# Patient Record
Sex: FEMALE | Race: WHITE | NOT HISPANIC OR LATINO | Employment: FULL TIME
[De-identification: names, ages, dates, MRNs, and addresses within clinical notes are randomized per-mention and may not be internally consistent; named-entity substitution may affect disease eponyms.]

---

## 2022-10-03 ENCOUNTER — HEALTH MAINTENANCE LETTER (OUTPATIENT)
Age: 37
End: 2022-10-03

## 2023-03-01 ENCOUNTER — HOSPITAL ENCOUNTER (INPATIENT)
Facility: CLINIC | Age: 38
LOS: 2 days | Discharge: ANOTHER HOSPITAL | DRG: 418 | End: 2023-03-03
Attending: EMERGENCY MEDICINE | Admitting: HOSPITALIST
Payer: COMMERCIAL

## 2023-03-01 ENCOUNTER — APPOINTMENT (OUTPATIENT)
Dept: ULTRASOUND IMAGING | Facility: CLINIC | Age: 38
DRG: 418 | End: 2023-03-01
Attending: EMERGENCY MEDICINE
Payer: COMMERCIAL

## 2023-03-01 ENCOUNTER — APPOINTMENT (OUTPATIENT)
Dept: MRI IMAGING | Facility: CLINIC | Age: 38
DRG: 418 | End: 2023-03-01
Attending: EMERGENCY MEDICINE
Payer: COMMERCIAL

## 2023-03-01 DIAGNOSIS — R79.89 ELEVATED LFTS: ICD-10-CM

## 2023-03-01 DIAGNOSIS — K81.0 ACUTE CHOLECYSTITIS: ICD-10-CM

## 2023-03-01 DIAGNOSIS — K83.09 CHOLANGITIS (H): Primary | ICD-10-CM

## 2023-03-01 LAB
ALBUMIN SERPL-MCNC: 3.5 G/DL (ref 3.5–5)
ALP SERPL-CCNC: 266 U/L (ref 45–120)
ALT SERPL W P-5'-P-CCNC: 567 U/L (ref 0–45)
ANION GAP SERPL CALCULATED.3IONS-SCNC: 13 MMOL/L (ref 5–18)
AST SERPL W P-5'-P-CCNC: 612 U/L (ref 0–40)
ATRIAL RATE - MUSE: 96 BPM
BASOPHILS # BLD AUTO: 0.1 10E3/UL (ref 0–0.2)
BASOPHILS NFR BLD AUTO: 1 %
BILIRUB DIRECT SERPL-MCNC: 1.1 MG/DL
BILIRUB SERPL-MCNC: 1.8 MG/DL (ref 0–1)
BUN SERPL-MCNC: 9 MG/DL (ref 8–22)
CALCIUM SERPL-MCNC: 9.6 MG/DL (ref 8.5–10.5)
CHLORIDE BLD-SCNC: 105 MMOL/L (ref 98–107)
CO2 SERPL-SCNC: 23 MMOL/L (ref 22–31)
CREAT SERPL-MCNC: 1.04 MG/DL (ref 0.6–1.1)
DIASTOLIC BLOOD PRESSURE - MUSE: NORMAL MMHG
EOSINOPHIL # BLD AUTO: 0.1 10E3/UL (ref 0–0.7)
EOSINOPHIL NFR BLD AUTO: 1 %
ERYTHROCYTE [DISTWIDTH] IN BLOOD BY AUTOMATED COUNT: 15.8 % (ref 10–15)
GFR SERPL CREATININE-BSD FRML MDRD: 70 ML/MIN/1.73M2
GLUCOSE BLD-MCNC: 125 MG/DL (ref 70–125)
HCG SERPL QL: NEGATIVE
HCT VFR BLD AUTO: 38 % (ref 35–47)
HGB BLD-MCNC: 11.9 G/DL (ref 11.7–15.7)
HOLD SPECIMEN: NORMAL
IMM GRANULOCYTES # BLD: 0.1 10E3/UL
IMM GRANULOCYTES NFR BLD: 1 %
INTERPRETATION ECG - MUSE: NORMAL
LIPASE SERPL-CCNC: 24 U/L (ref 0–52)
LYMPHOCYTES # BLD AUTO: 0.8 10E3/UL (ref 0.8–5.3)
LYMPHOCYTES NFR BLD AUTO: 10 %
MCH RBC QN AUTO: 24.4 PG (ref 26.5–33)
MCHC RBC AUTO-ENTMCNC: 31.3 G/DL (ref 31.5–36.5)
MCV RBC AUTO: 78 FL (ref 78–100)
MONOCYTES # BLD AUTO: 0.5 10E3/UL (ref 0–1.3)
MONOCYTES NFR BLD AUTO: 6 %
NEUTROPHILS # BLD AUTO: 6.4 10E3/UL (ref 1.6–8.3)
NEUTROPHILS NFR BLD AUTO: 81 %
NRBC # BLD AUTO: 0 10E3/UL
NRBC BLD AUTO-RTO: 0 /100
P AXIS - MUSE: 46 DEGREES
PLATELET # BLD AUTO: 315 10E3/UL (ref 150–450)
POTASSIUM BLD-SCNC: 3.9 MMOL/L (ref 3.5–5)
PR INTERVAL - MUSE: 134 MS
PROT SERPL-MCNC: 6.8 G/DL (ref 6–8)
QRS DURATION - MUSE: 86 MS
QT - MUSE: 368 MS
QTC - MUSE: 464 MS
R AXIS - MUSE: 74 DEGREES
RBC # BLD AUTO: 4.87 10E6/UL (ref 3.8–5.2)
SARS-COV-2 RNA RESP QL NAA+PROBE: NEGATIVE
SODIUM SERPL-SCNC: 141 MMOL/L (ref 136–145)
SYSTOLIC BLOOD PRESSURE - MUSE: NORMAL MMHG
T AXIS - MUSE: 66 DEGREES
TROPONIN I SERPL-MCNC: <0.01 NG/ML (ref 0–0.29)
TROPONIN T BLD-MCNC: 0 UG/L
VENTRICULAR RATE- MUSE: 96 BPM
WBC # BLD AUTO: 8 10E3/UL (ref 4–11)

## 2023-03-01 PROCEDURE — 84484 ASSAY OF TROPONIN QUANT: CPT | Performed by: EMERGENCY MEDICINE

## 2023-03-01 PROCEDURE — 80048 BASIC METABOLIC PNL TOTAL CA: CPT | Performed by: EMERGENCY MEDICINE

## 2023-03-01 PROCEDURE — 36415 COLL VENOUS BLD VENIPUNCTURE: CPT | Performed by: EMERGENCY MEDICINE

## 2023-03-01 PROCEDURE — C9803 HOPD COVID-19 SPEC COLLECT: HCPCS

## 2023-03-01 PROCEDURE — 96361 HYDRATE IV INFUSION ADD-ON: CPT

## 2023-03-01 PROCEDURE — 250N000011 HC RX IP 250 OP 636: Performed by: EMERGENCY MEDICINE

## 2023-03-01 PROCEDURE — 76705 ECHO EXAM OF ABDOMEN: CPT

## 2023-03-01 PROCEDURE — 80076 HEPATIC FUNCTION PANEL: CPT | Performed by: EMERGENCY MEDICINE

## 2023-03-01 PROCEDURE — 96375 TX/PRO/DX INJ NEW DRUG ADDON: CPT

## 2023-03-01 PROCEDURE — U0005 INFEC AGEN DETEC AMPLI PROBE: HCPCS | Performed by: HOSPITALIST

## 2023-03-01 PROCEDURE — 74183 MRI ABD W/O CNTR FLWD CNTR: CPT

## 2023-03-01 PROCEDURE — 120N000001 HC R&B MED SURG/OB

## 2023-03-01 PROCEDURE — 80074 ACUTE HEPATITIS PANEL: CPT | Performed by: EMERGENCY MEDICINE

## 2023-03-01 PROCEDURE — 258N000003 HC RX IP 258 OP 636: Performed by: HOSPITALIST

## 2023-03-01 PROCEDURE — 96365 THER/PROPH/DIAG IV INF INIT: CPT | Mod: 59

## 2023-03-01 PROCEDURE — 93005 ELECTROCARDIOGRAM TRACING: CPT | Performed by: EMERGENCY MEDICINE

## 2023-03-01 PROCEDURE — 255N000002 HC RX 255 OP 636: Performed by: HOSPITALIST

## 2023-03-01 PROCEDURE — 83690 ASSAY OF LIPASE: CPT | Performed by: EMERGENCY MEDICINE

## 2023-03-01 PROCEDURE — 250N000011 HC RX IP 250 OP 636: Performed by: HOSPITALIST

## 2023-03-01 PROCEDURE — 84703 CHORIONIC GONADOTROPIN ASSAY: CPT | Performed by: EMERGENCY MEDICINE

## 2023-03-01 PROCEDURE — 258N000003 HC RX IP 258 OP 636: Performed by: EMERGENCY MEDICINE

## 2023-03-01 PROCEDURE — A9585 GADOBUTROL INJECTION: HCPCS | Performed by: HOSPITALIST

## 2023-03-01 PROCEDURE — 99222 1ST HOSP IP/OBS MODERATE 55: CPT | Mod: AI | Performed by: HOSPITALIST

## 2023-03-01 PROCEDURE — 99285 EMERGENCY DEPT VISIT HI MDM: CPT | Mod: 25

## 2023-03-01 PROCEDURE — 85025 COMPLETE CBC W/AUTO DIFF WBC: CPT | Performed by: EMERGENCY MEDICINE

## 2023-03-01 RX ORDER — ACETAMINOPHEN 500 MG
500-1000 TABLET ORAL EVERY 6 HOURS PRN
COMMUNITY

## 2023-03-01 RX ORDER — LORAZEPAM 2 MG/ML
1 INJECTION INTRAMUSCULAR ONCE
Status: COMPLETED | OUTPATIENT
Start: 2023-03-01 | End: 2023-03-01

## 2023-03-01 RX ORDER — HYDROMORPHONE HYDROCHLORIDE 1 MG/ML
0.5 INJECTION, SOLUTION INTRAMUSCULAR; INTRAVENOUS; SUBCUTANEOUS
Status: DISCONTINUED | OUTPATIENT
Start: 2023-03-01 | End: 2023-03-01

## 2023-03-01 RX ORDER — ONDANSETRON 2 MG/ML
4 INJECTION INTRAMUSCULAR; INTRAVENOUS
Status: COMPLETED | OUTPATIENT
Start: 2023-03-01 | End: 2023-03-01

## 2023-03-01 RX ORDER — ONDANSETRON 2 MG/ML
4 INJECTION INTRAMUSCULAR; INTRAVENOUS EVERY 6 HOURS PRN
Status: DISCONTINUED | OUTPATIENT
Start: 2023-03-01 | End: 2023-03-02

## 2023-03-01 RX ORDER — HYDRALAZINE HYDROCHLORIDE 20 MG/ML
5 INJECTION INTRAMUSCULAR; INTRAVENOUS EVERY 6 HOURS PRN
Status: DISCONTINUED | OUTPATIENT
Start: 2023-03-01 | End: 2023-03-02

## 2023-03-01 RX ORDER — PIPERACILLIN SODIUM, TAZOBACTAM SODIUM 3; .375 G/15ML; G/15ML
3.38 INJECTION, POWDER, LYOPHILIZED, FOR SOLUTION INTRAVENOUS ONCE
Status: COMPLETED | OUTPATIENT
Start: 2023-03-01 | End: 2023-03-01

## 2023-03-01 RX ORDER — SODIUM CHLORIDE 9 MG/ML
INJECTION, SOLUTION INTRAVENOUS ONCE
Status: COMPLETED | OUTPATIENT
Start: 2023-03-01 | End: 2023-03-01

## 2023-03-01 RX ORDER — PIPERACILLIN SODIUM, TAZOBACTAM SODIUM 3; .375 G/15ML; G/15ML
3.38 INJECTION, POWDER, LYOPHILIZED, FOR SOLUTION INTRAVENOUS EVERY 8 HOURS
Status: DISCONTINUED | OUTPATIENT
Start: 2023-03-01 | End: 2023-03-02

## 2023-03-01 RX ORDER — GADOBUTROL 604.72 MG/ML
10 INJECTION INTRAVENOUS ONCE
Status: COMPLETED | OUTPATIENT
Start: 2023-03-01 | End: 2023-03-01

## 2023-03-01 RX ORDER — HYDROMORPHONE HYDROCHLORIDE 1 MG/ML
0.5 INJECTION, SOLUTION INTRAMUSCULAR; INTRAVENOUS; SUBCUTANEOUS
Status: DISCONTINUED | OUTPATIENT
Start: 2023-03-01 | End: 2023-03-02

## 2023-03-01 RX ORDER — LIDOCAINE 40 MG/G
CREAM TOPICAL
Status: DISCONTINUED | OUTPATIENT
Start: 2023-03-01 | End: 2023-03-02

## 2023-03-01 RX ADMIN — LORAZEPAM 0.5 MG: 2 INJECTION INTRAMUSCULAR; INTRAVENOUS at 18:45

## 2023-03-01 RX ADMIN — HYDROMORPHONE HYDROCHLORIDE 0.5 MG: 1 INJECTION, SOLUTION INTRAMUSCULAR; INTRAVENOUS; SUBCUTANEOUS at 16:21

## 2023-03-01 RX ADMIN — FAMOTIDINE 20 MG: 10 INJECTION, SOLUTION INTRAVENOUS at 11:43

## 2023-03-01 RX ADMIN — ONDANSETRON 4 MG: 2 INJECTION INTRAMUSCULAR; INTRAVENOUS at 09:48

## 2023-03-01 RX ADMIN — HYDROMORPHONE HYDROCHLORIDE 0.5 MG: 1 INJECTION, SOLUTION INTRAMUSCULAR; INTRAVENOUS; SUBCUTANEOUS at 12:32

## 2023-03-01 RX ADMIN — DEXTROSE AND SODIUM CHLORIDE: 5; 900 INJECTION, SOLUTION INTRAVENOUS at 17:43

## 2023-03-01 RX ADMIN — PIPERACILLIN AND TAZOBACTAM 3.38 G: 3; .375 INJECTION, POWDER, FOR SOLUTION INTRAVENOUS at 13:30

## 2023-03-01 RX ADMIN — PIPERACILLIN AND TAZOBACTAM 3.38 G: 3; .375 INJECTION, POWDER, FOR SOLUTION INTRAVENOUS at 20:33

## 2023-03-01 RX ADMIN — SODIUM CHLORIDE 1000 ML: 9 INJECTION, SOLUTION INTRAVENOUS at 09:53

## 2023-03-01 RX ADMIN — HYDROMORPHONE HYDROCHLORIDE 0.5 MG: 1 INJECTION, SOLUTION INTRAMUSCULAR; INTRAVENOUS; SUBCUTANEOUS at 22:10

## 2023-03-01 RX ADMIN — GADOBUTROL 10 ML: 604.72 INJECTION INTRAVENOUS at 19:42

## 2023-03-01 RX ADMIN — ONDANSETRON 4 MG: 2 INJECTION INTRAMUSCULAR; INTRAVENOUS at 18:44

## 2023-03-01 ASSESSMENT — ENCOUNTER SYMPTOMS
DYSURIA: 0
NAUSEA: 1
DIZZINESS: 1
ABDOMINAL PAIN: 1
BLOOD IN STOOL: 0
VOMITING: 1
SHORTNESS OF BREATH: 0
CONSTIPATION: 0
FATIGUE: 1
DIARRHEA: 1
APPETITE CHANGE: 1

## 2023-03-01 ASSESSMENT — ACTIVITIES OF DAILY LIVING (ADL)
ADLS_ACUITY_SCORE: 35

## 2023-03-01 NOTE — CONSULTS
[facility] DIGESTIVE HEALTH CONSULTATION    Sam MCLEOD Samrodriguez   [address]  [place] [address]  37 year old female    Admission Date/Time: 3/1/2023 11:04 AM    Primary Care Provider:  System, Provider Not In    Requesting Physician: Oliva Horan DO    ASSESSMENT AND RECOMMENDATIONS:   37-year-old who presents for nausea, vomiting, and abdominal pain, found to have hepatitis.    Symptoms of nausea, vomiting, and epigastric abdominal pain for the last 9 days since 2/20.  Noted to have liver injury, predominantly hepatocellular.  Differential includes biliary etiology such as cholecystitis, choledocholithiasis, cholangitis, in addition to causes of hepatitis such as hepatitis A or B or CMV.  Bilirubin nearly normal.  Other etiologies such as autoimmune hepatitis, hemochromatosis are considered less likely given acute onset.  No medications, supplements, alcohol, or drug use to explain findings.      -Hepatitis panel  -MRCP  -Appreciate surgery recommendations  -Agree with antibiotics for now  -Repeat LFTs in a.m    Approximately 45 minutes of total time was spent providing patient care including patient evaluation, reviewing documentation/test results, and .            Nixon Rao MD  Thank you for the opportunity to participate in the care of this patient.   Please feel free to call me with any questions or concerns.  Phone number [phone] or if after 5PM [phone].         CHIEF COMPLAINT:   Nausea, vomiting, abdominal pain    REASON FOR THE CONSULT: Hepatitis    HPI:   37-year-old who presents for nausea, vomiting, and abdominal pain, found to have hepatitis.    She ate sushi on February 19 and started to have nausea and vomiting the next day on the 20th.  At initial onset she had diarrhea for 3 days although this is resolved.  She her temperature was 100  F on the 20th.  She has epigastric pain which may be related to vomiting.  She has no sick contacts.  Nobody else ate the  "jessika.  She has been taking Tylenol molt Mylanta but only in small doses intermittently.  No over-the-counter medications.  No alcohol, drug use.  Non-smoker.  No mushroom foraging.    Since she got Zofran she has been feeling slightly better.  She works full-time at a nursing home.  She is accompanied today by her wife.    REVIEW OF SYSTEMS:  10 point review of systems otherwise negative    PAST MEDICAL HISTORY:  Obesity    PAST SURGICAL HISTORY:  No biliary surgery    FAMILY HISTORY:  Mother had cholecystectomy    SOCIAL HISTORY:  Non-smoker, no alcohol use.    ALLERGIES/SENSITIVITIES:  Patient has no known allergies.    MEDICATIONS:  Current Outpatient Medications   Medication Sig Dispense Refill     acetaminophen (TYLENOL) 500 MG tablet Take 500-1,000 mg by mouth every 6 hours as needed for mild pain       omeprazole (PRILOSEC) 20 MG DR capsule Take 20 mg by mouth daily as needed       PHYSICAL EXAM:  BP (!) 218/136   Pulse 87   Temp 97.7  F (36.5  C) (Tympanic)   Resp 20   Ht 1.727 m (5' 8\")   Wt 131.5 kg (290 lb)   LMP 02/16/2023   SpO2 97%   BMI 44.09 kg/m    Body mass index is 44.09 kg/m .  General: A&O, NAD, non-toxic appearing  Eyes: No icterus or conjunctivitis  ENT: MMM  Neck/Thyroid: Supple  Pulmonary: Nonlabored  Cardiovascular: Regular rate  Gastrointestinal: Soft, NTTP, NABS, no r/g, no masses, no HSM  Skin: No jaundice/petechiae/rashes  Lymph nodes: No cervical or supraclavicular lymphadenopathy  Extrem: PPI, no c/c/e    LABORATORY DATA:  CBC:  Recent Labs   Lab Test 03/01/23  0948   WBC 8.0   RBC 4.87   HGB 11.9   HCT 38.0   MCV 78   MCH 24.4*   MCHC 31.3*   RDW 15.8*           BMP:  Recent Labs   Lab 03/01/23  0948      POTASSIUM 3.9   CHLORIDE 105   CO2 23      CR 1.04   BUN 9       INR:  No results for input(s): INR in the last 53691 hours.    Liver and Pancreas panel:  Recent Labs   Lab 03/01/23  0948   *   *   ALKPHOS 266*   BILITOTAL 1.8*   LIPASE 24 "     IMAGING:    Abdomen US, limited (RUQ only)  Result Date: 3/1/2023  EXAM: US ABDOMEN LIMITED LOCATION: [facility] DATE/TIME: 3/1/2023 12:52 PM INDICATION: Epigastric abdominal pain, elevated LFTs COMPARISON: None. TECHNIQUE: Limited abdominal ultrasound. FINDINGS: GALLBLADDER: Well-distended with sludge and layering stones. Gallbladder wall is minimally thickened and there is minimal adjacent edema in the gallbladder fossa no pericholecystic fluid.. There is a sonographic Brown's sign. BILE DUCTS: No biliary dilatation. The common duct measures 6 mm. LIVER: Diffuse fatty infiltration of the liver. No focal mass. RIGHT KIDNEY: No hydronephrosis. PANCREAS: The visualized portions are normal. No ascites.     IMPRESSION: 1.  Findings suggestive of an early cholecystitis. 2.  There is gallbladder sludge and multiple tiny dependent gallstones. 3.  Common bile duct is at the upper limits of normal at 6 mm but no stones in the visible portion of the duct. No intrahepatic biliary dilatation.      CC: [facility], Provider Not In

## 2023-03-01 NOTE — CONSULTS
Surgery Note:  Chart and imaging reviewed, case discussed with ER. 36 yo female with n,v and epigastric pain for several days.  LFTs elevated, US with mildly dilated cbd. GI consulted and recommended MRCP.   -depending on repeat LFTs and findings of MRCP, will plan on either surgery tomorrow with IOC or transfer to [facility] for ERCP and surgery.   -full consult to follow     Brandon Joseph DO [facility] Surgery  [phone]

## 2023-03-01 NOTE — ED PROVIDER NOTES
EMERGENCY DEPARTMENT ENCOUNTER      NAME: Sam Morales  AGE: 37 year old female  YOB: 1985  MRN: [id number]  EVALUATION DATE & TIME: No admission date for patient encounter.    PCP: No primary care provider on file.    ED PROVIDER: Oliva Horan DO      Chief Complaint   Patient presents with     Chest Pain         FINAL IMPRESSION:  1. Acute cholecystitis    2. Elevated LFTs          ED COURSE & MEDICAL DECISION MAKING:    Pertinent Labs & Imaging studies reviewed. (See chart for details)  9:44 AM I met the patient and performed my initial interview and exam.  1:30 PM Discussed with Dr. Rao, [facility].  Recommends MRCP, acute hepatitis panel  2:10 PM Discussed with Dr. Joseph, General surgery.  Recommend admission to hospitalist, antibiotics.  3:05 PM Discussed admission with Hospitalist, Dr. Ascencio for admission    37 year old female presents to the Emergency Department for evaluation of upper abdominal pain.    MEDICAL DECISION MAKING: I was concerned about this patient's UPPER ABDOMINAL pain and considered multiple causes including but not limited to the following.        Atypical Presentation for ACS: Patient does not have SOB or chest pain.  Screening EKG was without acute ischemic changes. Troponin is negative    Transverse colitis / diverticulitis: Patient does not have diarrhea or fevers.     Appendicitis: Patient does not have RLQ TTP.      Ectopic Pregnancy: Patient is a 37 year old female.  UPT negative    Pyelonephritis: Urine is not consistent with infection    Ureterolithiasis/Renal Colic:  Urine does not show blood.     Pancreatitis:  Lipase was normal     Biliary Colic:  ABD US is with gallstones and w/o signs of biliary obstruction. LFT's are elevated    Acute cholecystitis:  ABD US is with GB wall thickening and concerns for early cholecystitis    Others benign causes including: PUD, gastritis, GERD, intestinal colic / gas pains         IMPRESSION: Based on this patient's history,  exam, and diagnostic results, the working diagnosis of this patient's abdominal pain is acute cholecystitis and concerns for choledocholithiasis.          DISPOSITION PLAN:      Admit Plan.  Patient is to be admitted to the hospitalist service.    At the conclusion of the encounter I discussed the results of all of the tests and the disposition. The questions were answered. The patient or family acknowledged understanding and was agreeable with the care plan.     Medical Decision Making    History:    Supplemental history from: Documented in chart, if applicable    External Record(s) reviewed: Documented in chart, if applicable.    Work Up:    Chart documentation includes differential considered and any EKGs or imaging independently interpreted by provider, where specified.    In additional to work up documented, I considered the following work up: Documented in chart, if applicable.    External consultation:    Discussion of management with another provider: Documented in chart, if applicable    Complicating factors:    Care impacted by chronic illness: N/A    Care affected by social determinants of health: N/A    Disposition considerations: Admit.      MEDICATIONS GIVEN IN THE EMERGENCY:  Medications   sodium chloride 0.9% infusion (has no administration in time range)   LORazepam (ATIVAN) injection 1 mg (has no administration in time range)   piperacillin-tazobactam (ZOSYN) 3.375 g vial to attach to  mL bag (has no administration in time range)   HYDROmorphone (PF) (DILAUDID) injection 0.5 mg (0.5 mg Intravenous $Given 3/1/23 1621)   ondansetron (ZOFRAN) injection 4 mg (4 mg Intravenous $Given 3/1/23 0948)   0.9% sodium chloride BOLUS (0 mLs Intravenous Stopped 3/1/23 1110)   famotidine (PEPCID) injection 20 mg (20 mg Intravenous $Given 3/1/23 1143)   piperacillin-tazobactam (ZOSYN) 3.375 g vial to attach to  mL bag (0 g Intravenous Stopped 3/1/23 1429)       NEW PRESCRIPTIONS STARTED AT TODAY'S ER  "VISIT  New Prescriptions    No medications on file          =================================================================    HPI    Patient information was obtained from: Patient    Use of : N/A         Sam Morales is a 37 year old female with no pertinent past medical history who presents to this ED for evaluation of upper abdominal, chest pain and nausea vomiting.  Noted symptoms initially last week.  He related this to eating sushi.  Thought she had food poisoning.  Had vomiting and some diarrhea with this.  Seem to improve, however started up again yesterday.  Unable to tolerate water, applesauce.  Her pain is in her upper abdomen and goes into the middle of her chest.  No daily medications.  Has been taking omeprazole from time to time due to recent increase in indigestion.  Some mucus in her emesis with scant blood.  No sunny blood.  No melena.  Low-grade fever.  Social drinker, none recently.        REVIEW OF SYSTEMS   Review of Systems   Constitutional: Positive for appetite change and fatigue.   Respiratory: Negative for shortness of breath.    Cardiovascular: Positive for chest pain.   Gastrointestinal: Positive for abdominal pain, diarrhea, nausea and vomiting. Negative for blood in stool and constipation.   Genitourinary: Negative for dysuria and vaginal discharge.   Skin: Negative.    Neurological: Positive for dizziness.       PAST MEDICAL HISTORY:  No past medical history on file.    PAST SURGICAL HISTORY:  No past surgical history on file.        CURRENT MEDICATIONS:    acetaminophen (TYLENOL) 500 MG tablet  omeprazole (PRILOSEC) 20 MG DR capsule         ALLERGIES:  No Known Allergies    FAMILY HISTORY:  No family history on file.    SOCIAL HISTORY:   Social History     Socioeconomic History     Marital status:        VITALS:  BP (!) 218/136   Pulse 87   Temp 97.7  F (36.5  C) (Tympanic)   Resp 20   Ht 1.727 m (5' 8\")   Wt 131.5 kg (290 lb)   LMP 02/16/2023   SpO2 97% "   BMI 44.09 kg/m      PHYSICAL EXAM    Physical Exam  Constitutional:       General: She is not in acute distress.  HENT:      Head: Normocephalic and atraumatic.      Mouth/Throat:      Pharynx: Oropharynx is clear.   Eyes:      Pupils: Pupils are equal, round, and reactive to light.   Cardiovascular:      Rate and Rhythm: Normal rate and regular rhythm.      Pulses: Normal pulses.      Heart sounds: Normal heart sounds.   Pulmonary:      Effort: Pulmonary effort is normal.      Breath sounds: Normal breath sounds.   Abdominal:      General: Abdomen is flat. Bowel sounds are normal.      Palpations: Abdomen is soft.      Tenderness: There is abdominal tenderness in the right upper quadrant and epigastric area. There is no guarding or rebound.   Musculoskeletal:         General: Normal range of motion.   Skin:     General: Skin is warm and dry.      Capillary Refill: Capillary refill takes less than 2 seconds.   Neurological:      General: No focal deficit present.      Mental Status: She is alert and oriented to person, place, and time.          LAB:  All pertinent labs reviewed and interpreted.  Labs Ordered and Resulted from Time of ED Arrival to Time of ED Departure   CBC WITH PLATELETS AND DIFFERENTIAL - Abnormal       Result Value    WBC Count 8.0      RBC Count 4.87      Hemoglobin 11.9      Hematocrit 38.0      MCV 78      MCH 24.4 (*)     MCHC 31.3 (*)     RDW 15.8 (*)     Platelet Count 315      % Neutrophils 81      % Lymphocytes 10      % Monocytes 6      % Eosinophils 1      % Basophils 1      % Immature Granulocytes 1      NRBCs per 100 WBC 0      Absolute Neutrophils 6.4      Absolute Lymphocytes 0.8      Absolute Monocytes 0.5      Absolute Eosinophils 0.1      Absolute Basophils 0.1      Absolute Immature Granulocytes 0.1      Absolute NRBCs 0.0     HEPATIC FUNCTION PANEL - Abnormal    Bilirubin Total 1.8 (*)     Bilirubin Direct 1.1 (*)     Protein Total 6.8      Albumin 3.5      Alkaline  Phosphatase 266 (*)      (*)      (*)    BASIC METABOLIC PANEL - Normal    Sodium 141      Potassium 3.9      Chloride 105      Carbon Dioxide (CO2) 23      Anion Gap 13      Urea Nitrogen 9      Creatinine 1.04      Calcium 9.6      Glucose 125      GFR Estimate 70     TROPONIN I - Normal    Troponin I <0.01     HCG QUALITATIVE PREGNANCY - Normal    hCG Serum Qualitative Negative     ISTAT TROPONIN POCT - Normal    TROPPC POCT 0.00     LIPASE - Normal    Lipase 24     ACUTE HEPATITIS PANEL       RADIOLOGY:  Reviewed all pertinent imaging. Please see official radiology report.  Abdomen US, limited (RUQ only)   Final Result   IMPRESSION:   1.  Findings suggestive of an early cholecystitis.   2.  There is gallbladder sludge and multiple tiny dependent gallstones.   3.  Common bile duct is at the upper limits of normal at 6 mm but no stones in the visible portion of the duct. No intrahepatic biliary dilatation.      MR Abdomen MRCP w/o & w Contrast    (Results Pending)       EKG:    Performed at: 9:42 AM    Impression: Sinus rhythm, no acute ST elevations or depressions, no prior EKG for comparison    Rate: 96 bpm  Rhythm: Sinus  Axis: Normal  ND Interval: 134 ms  QRS Interval: 86 ms  QTc Interval: 464 ms  ST Changes: Applicable  Comparison: None    I have independently reviewed and interpreted the EKG(s) documented above.    Oliva Horan DO  Emergency Medicine  [facility] EMERGENCY ROOM  [address] [facility] [address] [phone]  Dept: [phone]     Oliva Horan DO  03/01/23 3766

## 2023-03-01 NOTE — PHARMACY-ADMISSION MEDICATION HISTORY
Pharmacy Note - Admission Medication History    Pertinent Provider Information:    ______________________________________________________________________    Prior To Admission (PTA) med list completed and updated in EMR.       PTA Med List   Medication Sig Last Dose     acetaminophen (TYLENOL) 500 MG tablet Take 500-1,000 mg by mouth every 6 hours as needed for mild pain 3/1/2023 at 0600     omeprazole (PRILOSEC) 20 MG DR capsule Take 20 mg by mouth daily as needed Past Week       Information source(s): Patient and CareEverwhere/[facility]    Method of interview communication: in-person    Patient was asked about OTC/herbal products specifically.  PTA med list reflects this.    Based on the pharmacist's assessment, the PTA med list information appears reliable    Medication Affordability:  Not including over the counter (OTC) medications, was there a time in the past 12 months when you did not take your medications as prescribed because of cost?: No    Allergies were reviewed, assessed, and updated with the patient.      Patient did not bring any medications to the hospital and can't retrieve from home. No multi-dose medications are available for use during hospital stay.      Thank you for the opportunity to participate in the care of this patient.      Juni Herron [facility]     3/1/2023     12:59 PM

## 2023-03-01 NOTE — H&P
"[facility]    History and Physical - Hospitalist Service       Date of Admission:  3/1/2023    Assessment & Plan      Sam Morales is a 37 year old female who presents with acute abdominal pain.  She is currently hemodynamically stable without fever.  Her history is consistent with biliary colic, and she subsequently developed acute cholecystitis.  Her LFTs suggestive of choledocholithiasis, however no clear evidence of an obstructing stone on US.  She could have passed a gallstone.  Further evaluation with MRCP is pending.  No evidence of cholangitis currently.  Anticipate cholecystectomy +/- ERCP depending on the MRCP.    Patient with hypertensive urgency, initially considered 2/2 pain.  However, hypertension has persisted.  Treat with prn hydralazine.    # Acute cholecystitis  # Possible choledocolithiasis  - pip/tazo  - MRCP  - general surgery, GI consulted  - NPO    # Hypertensive urgency  - IV hydralazine for SBP >200       Diet: NPO per Anesthesia Guidelines for Procedure/Surgery Except for: Meds    DVT Prophylaxis: held anticipating procedure  Crooks Catheter: Not present  Lines: None     Cardiac Monitoring: None  Code Status:   full code, ok to intubate for primary respiratory failure    Clinically Significant Risk Factors Present on Admission                       # Severe Obesity: Estimated body mass index is 44.09 kg/m  as calculated from the following:    Height as of this encounter: 1.727 m (5' 8\").    Weight as of this encounter: 131.5 kg (290 lb).           Disposition Plan      Expected Discharge Date: 03/03/2023                  Trevor Ascencio MD  Hospitalist Service  [facility]  Securely message with [employer] (more info)  Text page via [facility] Paging/Directory     ______________________________________________________________________    Chief Complaint   Abdominal pain    History is obtained from the patient    History of Present Illness   Sam " HARSHA Morales is a 37 year old female without significant past medical history who presents with a chief complain of abdominal pain.    The patient reports that she had sushi a little over a week ago and subsequently developed nausea and vomiting.  She had subsequent improvement in her symptoms a few days ago.  However, yesterday afternoon, she acutely developed abdominal pain.  The pain is epigastric and radiates to the flanks.  It improved with maalox.  However, her pain recurred this morning and she subsequently presented to the ED.  She has had ongoing vomiting as well.  Denies fevers or chills.      Past Medical History    No past medical history on file.    Past Surgical History   No past surgical history on file.    Prior to Admission Medications   Prior to Admission Medications   Prescriptions Last Dose Informant Patient Reported? Taking?   acetaminophen (TYLENOL) 500 MG tablet 3/1/2023 at 0600  Yes Yes   Sig: Take 500-1,000 mg by mouth every 6 hours as needed for mild pain   omeprazole (PRILOSEC) 20 MG DR capsule Past Week  Yes Yes   Sig: Take 20 mg by mouth daily as needed      Facility-Administered Medications: None      ]  Physical Exam   Vital Signs: Temp: 97.7  F (36.5  C) Temp src: Tympanic BP: (!) 218/136 Pulse: 87   Resp: 20 SpO2: 97 % O2 Device: None (Room air)    Weight: 290 lbs 0 oz    Gen:  Sitting comfortably in chair, NAD; alert, conversant  CV:  Nl rate, regular rhythm to palpation  Pulm: no acute resp distress  GI:  Abdomen soft, non distended, epigastric TTP; negative alcantara's    Medical Decision Making       30 MINUTES SPENT BY ME on the date of service doing chart review, history, exam, documentation & further activities per the note.      Data   Na 141  K 3.9  BUN 9  Cr 1    Alk phos 266      T bili 1.8, d 1.1    WBC 8  Hgb 12  Plts 315    US abdomen  1.  Findings suggestive of an early cholecystitis.  2.  There is gallbladder sludge and multiple tiny dependent  gallstones.  3.  Common bile duct is at the upper limits of normal at 6 mm but no stones in the visible portion of the duct. No intrahepatic biliary dilatation.

## 2023-03-01 NOTE — ED TRIAGE NOTES
Pt reports chest pain and upper abdominal pain intermittently since Feb 20th.  Pt reports vomiting the 20th-24th was improving the 24th but now vomiting this morning again.  Pt states she feels anxious.

## 2023-03-02 ENCOUNTER — ANESTHESIA EVENT (OUTPATIENT)
Dept: SURGERY | Facility: CLINIC | Age: 38
DRG: 418 | End: 2023-03-02
Payer: COMMERCIAL

## 2023-03-02 ENCOUNTER — ANESTHESIA (OUTPATIENT)
Dept: SURGERY | Facility: CLINIC | Age: 38
DRG: 418 | End: 2023-03-02
Payer: COMMERCIAL

## 2023-03-02 ENCOUNTER — ANESTHESIA EVENT (OUTPATIENT)
Dept: SURGERY | Facility: HOSPITAL | Age: 38
End: 2023-03-02
Payer: COMMERCIAL

## 2023-03-02 ENCOUNTER — APPOINTMENT (OUTPATIENT)
Dept: RADIOLOGY | Facility: CLINIC | Age: 38
DRG: 418 | End: 2023-03-02
Attending: SURGERY
Payer: COMMERCIAL

## 2023-03-02 LAB
ALBUMIN SERPL-MCNC: 3.1 G/DL (ref 3.5–5)
ALP SERPL-CCNC: 277 U/L (ref 45–120)
ALT SERPL W P-5'-P-CCNC: 679 U/L (ref 0–45)
ANION GAP SERPL CALCULATED.3IONS-SCNC: 8 MMOL/L (ref 5–18)
AST SERPL W P-5'-P-CCNC: 370 U/L (ref 0–40)
ATRIAL RATE - MUSE: 88 BPM
BILIRUB DIRECT SERPL-MCNC: 1.8 MG/DL
BILIRUB SERPL-MCNC: 2.7 MG/DL (ref 0–1)
BUN SERPL-MCNC: 6 MG/DL (ref 8–22)
CALCIUM SERPL-MCNC: 8.8 MG/DL (ref 8.5–10.5)
CHLORIDE BLD-SCNC: 108 MMOL/L (ref 98–107)
CO2 SERPL-SCNC: 25 MMOL/L (ref 22–31)
CREAT SERPL-MCNC: 1 MG/DL (ref 0.6–1.1)
DIASTOLIC BLOOD PRESSURE - MUSE: NORMAL MMHG
ERYTHROCYTE [DISTWIDTH] IN BLOOD BY AUTOMATED COUNT: 15.9 % (ref 10–15)
GFR SERPL CREATININE-BSD FRML MDRD: 73 ML/MIN/1.73M2
GLUCOSE BLD-MCNC: 138 MG/DL (ref 70–125)
HCT VFR BLD AUTO: 35.2 % (ref 35–47)
HGB BLD-MCNC: 10.7 G/DL (ref 11.7–15.7)
INTERPRETATION ECG - MUSE: NORMAL
MCH RBC QN AUTO: 24.5 PG (ref 26.5–33)
MCHC RBC AUTO-ENTMCNC: 30.4 G/DL (ref 31.5–36.5)
MCV RBC AUTO: 81 FL (ref 78–100)
P AXIS - MUSE: 17 DEGREES
PLATELET # BLD AUTO: 232 10E3/UL (ref 150–450)
POTASSIUM BLD-SCNC: 3.8 MMOL/L (ref 3.5–5)
PR INTERVAL - MUSE: 142 MS
PROT SERPL-MCNC: 6 G/DL (ref 6–8)
QRS DURATION - MUSE: 86 MS
QT - MUSE: 386 MS
QTC - MUSE: 467 MS
R AXIS - MUSE: 29 DEGREES
RBC # BLD AUTO: 4.36 10E6/UL (ref 3.8–5.2)
SODIUM SERPL-SCNC: 141 MMOL/L (ref 136–145)
SYSTOLIC BLOOD PRESSURE - MUSE: NORMAL MMHG
T AXIS - MUSE: 22 DEGREES
VENTRICULAR RATE- MUSE: 88 BPM
WBC # BLD AUTO: 8.8 10E3/UL (ref 4–11)

## 2023-03-02 PROCEDURE — 258N000003 HC RX IP 258 OP 636

## 2023-03-02 PROCEDURE — 120N000001 HC R&B MED SURG/OB

## 2023-03-02 PROCEDURE — 272N000001 HC OR GENERAL SUPPLY STERILE: Performed by: SURGERY

## 2023-03-02 PROCEDURE — 250N000013 HC RX MED GY IP 250 OP 250 PS 637: Performed by: HOSPITALIST

## 2023-03-02 PROCEDURE — 258N000003 HC RX IP 258 OP 636: Performed by: SURGERY

## 2023-03-02 PROCEDURE — 370N000017 HC ANESTHESIA TECHNICAL FEE, PER MIN: Performed by: SURGERY

## 2023-03-02 PROCEDURE — 250N000013 HC RX MED GY IP 250 OP 250 PS 637: Performed by: SURGERY

## 2023-03-02 PROCEDURE — 82248 BILIRUBIN DIRECT: CPT | Performed by: HOSPITALIST

## 2023-03-02 PROCEDURE — 93005 ELECTROCARDIOGRAM TRACING: CPT | Performed by: HOSPITALIST

## 2023-03-02 PROCEDURE — 80053 COMPREHEN METABOLIC PANEL: CPT | Performed by: HOSPITALIST

## 2023-03-02 PROCEDURE — 360N000083 HC SURGERY LEVEL 3 W/ FLUORO, PER MIN: Performed by: SURGERY

## 2023-03-02 PROCEDURE — 258N000003 HC RX IP 258 OP 636: Performed by: HOSPITALIST

## 2023-03-02 PROCEDURE — 255N000002 HC RX 255 OP 636: Performed by: SURGERY

## 2023-03-02 PROCEDURE — 47563 LAPARO CHOLECYSTECTOMY/GRAPH: CPT | Performed by: SURGERY

## 2023-03-02 PROCEDURE — 99232 SBSQ HOSP IP/OBS MODERATE 35: CPT | Performed by: HOSPITALIST

## 2023-03-02 PROCEDURE — 250N000025 HC SEVOFLURANE, PER MIN: Performed by: SURGERY

## 2023-03-02 PROCEDURE — 88342 IMHCHEM/IMCYTCHM 1ST ANTB: CPT | Mod: TC | Performed by: SURGERY

## 2023-03-02 PROCEDURE — 85014 HEMATOCRIT: CPT | Performed by: HOSPITALIST

## 2023-03-02 PROCEDURE — 0FT44ZZ RESECTION OF GALLBLADDER, PERCUTANEOUS ENDOSCOPIC APPROACH: ICD-10-PCS | Performed by: SURGERY

## 2023-03-02 PROCEDURE — 999N000141 HC STATISTIC PRE-PROCEDURE NURSING ASSESSMENT: Performed by: SURGERY

## 2023-03-02 PROCEDURE — 710N000010 HC RECOVERY PHASE 1, LEVEL 2, PER MIN: Performed by: SURGERY

## 2023-03-02 PROCEDURE — 250N000009 HC RX 250: Performed by: NURSE ANESTHETIST, CERTIFIED REGISTERED

## 2023-03-02 PROCEDURE — 250N000011 HC RX IP 250 OP 636

## 2023-03-02 PROCEDURE — 250N000011 HC RX IP 250 OP 636: Performed by: NURSE ANESTHETIST, CERTIFIED REGISTERED

## 2023-03-02 PROCEDURE — 999N000182 XR SURGERY CARM FLUORO GREATER THAN 5 MIN: Mod: TC

## 2023-03-02 PROCEDURE — 250N000011 HC RX IP 250 OP 636: Performed by: HOSPITALIST

## 2023-03-02 PROCEDURE — 36415 COLL VENOUS BLD VENIPUNCTURE: CPT | Performed by: HOSPITALIST

## 2023-03-02 PROCEDURE — 250N000011 HC RX IP 250 OP 636: Performed by: SURGERY

## 2023-03-02 PROCEDURE — 99221 1ST HOSP IP/OBS SF/LOW 40: CPT | Mod: 57 | Performed by: SURGERY

## 2023-03-02 RX ORDER — ONDANSETRON 4 MG/1
4 TABLET, ORALLY DISINTEGRATING ORAL EVERY 6 HOURS PRN
Status: CANCELLED | OUTPATIENT
Start: 2023-03-02

## 2023-03-02 RX ORDER — HYDROMORPHONE HCL IN WATER/PF 6 MG/30 ML
0.2 PATIENT CONTROLLED ANALGESIA SYRINGE INTRAVENOUS
Status: CANCELLED | OUTPATIENT
Start: 2023-03-02

## 2023-03-02 RX ORDER — ESMOLOL HYDROCHLORIDE 10 MG/ML
INJECTION INTRAVENOUS PRN
Status: DISCONTINUED | OUTPATIENT
Start: 2023-03-02 | End: 2023-03-02

## 2023-03-02 RX ORDER — CEFAZOLIN SODIUM/WATER 3 G/30 ML
SYRINGE (ML) INTRAVENOUS
Status: DISCONTINUED
Start: 2023-03-02 | End: 2023-03-02 | Stop reason: HOSPADM

## 2023-03-02 RX ORDER — ACETAMINOPHEN 325 MG/1
975 TABLET ORAL ONCE
Status: COMPLETED | OUTPATIENT
Start: 2023-03-02 | End: 2023-03-02

## 2023-03-02 RX ORDER — LIDOCAINE 40 MG/G
CREAM TOPICAL
Status: DISCONTINUED | OUTPATIENT
Start: 2023-03-02 | End: 2023-03-02 | Stop reason: HOSPADM

## 2023-03-02 RX ORDER — NALOXONE HYDROCHLORIDE 0.4 MG/ML
0.4 INJECTION, SOLUTION INTRAMUSCULAR; INTRAVENOUS; SUBCUTANEOUS
Status: DISCONTINUED | OUTPATIENT
Start: 2023-03-02 | End: 2023-03-03 | Stop reason: HOSPADM

## 2023-03-02 RX ORDER — LABETALOL HYDROCHLORIDE 5 MG/ML
10 INJECTION, SOLUTION INTRAVENOUS
Status: COMPLETED | OUTPATIENT
Start: 2023-03-02 | End: 2023-03-02

## 2023-03-02 RX ORDER — BUPIVACAINE HYDROCHLORIDE 2.5 MG/ML
INJECTION, SOLUTION INFILTRATION; PERINEURAL
Status: DISCONTINUED
Start: 2023-03-02 | End: 2023-03-02 | Stop reason: HOSPADM

## 2023-03-02 RX ORDER — ONDANSETRON 4 MG/1
4 TABLET, ORALLY DISINTEGRATING ORAL EVERY 6 HOURS PRN
Status: DISCONTINUED | OUTPATIENT
Start: 2023-03-02 | End: 2023-03-03 | Stop reason: HOSPADM

## 2023-03-02 RX ORDER — HYDROMORPHONE HCL IN WATER/PF 6 MG/30 ML
0.4 PATIENT CONTROLLED ANALGESIA SYRINGE INTRAVENOUS
Status: CANCELLED | OUTPATIENT
Start: 2023-03-02

## 2023-03-02 RX ORDER — SODIUM CHLORIDE, SODIUM LACTATE, POTASSIUM CHLORIDE, AND CALCIUM CHLORIDE .6; .31; .03; .02 G/100ML; G/100ML; G/100ML; G/100ML
IRRIGANT IRRIGATION PRN
Status: DISCONTINUED | OUTPATIENT
Start: 2023-03-02 | End: 2023-03-02 | Stop reason: HOSPADM

## 2023-03-02 RX ORDER — PROCHLORPERAZINE MALEATE 10 MG
10 TABLET ORAL EVERY 6 HOURS PRN
Status: CANCELLED | OUTPATIENT
Start: 2023-03-02

## 2023-03-02 RX ORDER — BUPIVACAINE HYDROCHLORIDE 2.5 MG/ML
INJECTION, SOLUTION INFILTRATION; PERINEURAL PRN
Status: DISCONTINUED | OUTPATIENT
Start: 2023-03-02 | End: 2023-03-02 | Stop reason: HOSPADM

## 2023-03-02 RX ORDER — ONDANSETRON 2 MG/ML
INJECTION INTRAMUSCULAR; INTRAVENOUS PRN
Status: DISCONTINUED | OUTPATIENT
Start: 2023-03-02 | End: 2023-03-02

## 2023-03-02 RX ORDER — LIDOCAINE HYDROCHLORIDE 10 MG/ML
INJECTION, SOLUTION INFILTRATION; PERINEURAL PRN
Status: DISCONTINUED | OUTPATIENT
Start: 2023-03-02 | End: 2023-03-02

## 2023-03-02 RX ORDER — FENTANYL CITRATE 50 UG/ML
INJECTION, SOLUTION INTRAMUSCULAR; INTRAVENOUS PRN
Status: DISCONTINUED | OUTPATIENT
Start: 2023-03-02 | End: 2023-03-02

## 2023-03-02 RX ORDER — PROCHLORPERAZINE MALEATE 10 MG
10 TABLET ORAL EVERY 6 HOURS PRN
Status: DISCONTINUED | OUTPATIENT
Start: 2023-03-02 | End: 2023-03-03 | Stop reason: HOSPADM

## 2023-03-02 RX ORDER — LIDOCAINE 40 MG/G
CREAM TOPICAL
Status: DISCONTINUED | OUTPATIENT
Start: 2023-03-02 | End: 2023-03-03 | Stop reason: HOSPADM

## 2023-03-02 RX ORDER — AMLODIPINE BESYLATE 5 MG/1
5 TABLET ORAL DAILY
Status: DISCONTINUED | OUTPATIENT
Start: 2023-03-02 | End: 2023-03-03

## 2023-03-02 RX ORDER — BISACODYL 10 MG
10 SUPPOSITORY, RECTAL RECTAL DAILY PRN
Status: CANCELLED | OUTPATIENT
Start: 2023-03-02

## 2023-03-02 RX ORDER — NALOXONE HYDROCHLORIDE 0.4 MG/ML
0.2 INJECTION, SOLUTION INTRAMUSCULAR; INTRAVENOUS; SUBCUTANEOUS
Status: DISCONTINUED | OUTPATIENT
Start: 2023-03-02 | End: 2023-03-03 | Stop reason: HOSPADM

## 2023-03-02 RX ORDER — HYDRALAZINE HYDROCHLORIDE 20 MG/ML
10 INJECTION INTRAMUSCULAR; INTRAVENOUS
Status: DISCONTINUED | OUTPATIENT
Start: 2023-03-02 | End: 2023-03-02 | Stop reason: HOSPADM

## 2023-03-02 RX ORDER — ONDANSETRON 2 MG/ML
4 INJECTION INTRAMUSCULAR; INTRAVENOUS EVERY 6 HOURS PRN
Status: CANCELLED | OUTPATIENT
Start: 2023-03-02

## 2023-03-02 RX ORDER — AMOXICILLIN 250 MG
1 CAPSULE ORAL 2 TIMES DAILY
Status: DISCONTINUED | OUTPATIENT
Start: 2023-03-02 | End: 2023-03-03 | Stop reason: HOSPADM

## 2023-03-02 RX ORDER — PROPOFOL 10 MG/ML
INJECTION, EMULSION INTRAVENOUS CONTINUOUS PRN
Status: DISCONTINUED | OUTPATIENT
Start: 2023-03-02 | End: 2023-03-02

## 2023-03-02 RX ORDER — ACETAMINOPHEN 325 MG/1
975 TABLET ORAL EVERY 8 HOURS
Status: DISCONTINUED | OUTPATIENT
Start: 2023-03-02 | End: 2023-03-02

## 2023-03-02 RX ORDER — SODIUM CHLORIDE, SODIUM LACTATE, POTASSIUM CHLORIDE, CALCIUM CHLORIDE 600; 310; 30; 20 MG/100ML; MG/100ML; MG/100ML; MG/100ML
INJECTION, SOLUTION INTRAVENOUS CONTINUOUS
Status: DISCONTINUED | OUTPATIENT
Start: 2023-03-02 | End: 2023-03-02 | Stop reason: HOSPADM

## 2023-03-02 RX ORDER — TRAMADOL HYDROCHLORIDE 50 MG/1
50 TABLET ORAL EVERY 6 HOURS PRN
Status: DISCONTINUED | OUTPATIENT
Start: 2023-03-02 | End: 2023-03-03 | Stop reason: HOSPADM

## 2023-03-02 RX ORDER — DEXAMETHASONE SODIUM PHOSPHATE 10 MG/ML
INJECTION, SOLUTION INTRAMUSCULAR; INTRAVENOUS PRN
Status: DISCONTINUED | OUTPATIENT
Start: 2023-03-02 | End: 2023-03-02

## 2023-03-02 RX ORDER — TRAMADOL HYDROCHLORIDE 50 MG/1
50 TABLET ORAL EVERY 6 HOURS PRN
Status: CANCELLED | OUTPATIENT
Start: 2023-03-02

## 2023-03-02 RX ORDER — HYDROMORPHONE HCL IN WATER/PF 6 MG/30 ML
0.2 PATIENT CONTROLLED ANALGESIA SYRINGE INTRAVENOUS EVERY 5 MIN PRN
Status: DISCONTINUED | OUTPATIENT
Start: 2023-03-02 | End: 2023-03-02 | Stop reason: HOSPADM

## 2023-03-02 RX ORDER — AMLODIPINE BESYLATE 5 MG/1
5 TABLET ORAL DAILY
Status: CANCELLED | OUTPATIENT
Start: 2023-03-02

## 2023-03-02 RX ORDER — FENTANYL CITRATE 50 UG/ML
50 INJECTION, SOLUTION INTRAMUSCULAR; INTRAVENOUS EVERY 5 MIN PRN
Status: DISCONTINUED | OUTPATIENT
Start: 2023-03-02 | End: 2023-03-02 | Stop reason: HOSPADM

## 2023-03-02 RX ORDER — PIPERACILLIN SODIUM, TAZOBACTAM SODIUM 3; .375 G/15ML; G/15ML
3.38 INJECTION, POWDER, LYOPHILIZED, FOR SOLUTION INTRAVENOUS EVERY 8 HOURS
Status: DISCONTINUED | OUTPATIENT
Start: 2023-03-02 | End: 2023-03-03 | Stop reason: HOSPADM

## 2023-03-02 RX ORDER — HYDRALAZINE HYDROCHLORIDE 25 MG/1
25 TABLET, FILM COATED ORAL ONCE
Status: COMPLETED | OUTPATIENT
Start: 2023-03-02 | End: 2023-03-02

## 2023-03-02 RX ORDER — ONDANSETRON 4 MG/1
4 TABLET, ORALLY DISINTEGRATING ORAL EVERY 30 MIN PRN
Status: DISCONTINUED | OUTPATIENT
Start: 2023-03-02 | End: 2023-03-02 | Stop reason: HOSPADM

## 2023-03-02 RX ORDER — CEFAZOLIN SODIUM/WATER 3 G/30 ML
3 SYRINGE (ML) INTRAVENOUS SEE ADMIN INSTRUCTIONS
Status: DISCONTINUED | OUTPATIENT
Start: 2023-03-02 | End: 2023-03-02 | Stop reason: HOSPADM

## 2023-03-02 RX ORDER — LIDOCAINE 40 MG/G
CREAM TOPICAL
Status: CANCELLED | OUTPATIENT
Start: 2023-03-02

## 2023-03-02 RX ORDER — HYDROMORPHONE HCL IN WATER/PF 6 MG/30 ML
0.4 PATIENT CONTROLLED ANALGESIA SYRINGE INTRAVENOUS
Status: DISCONTINUED | OUTPATIENT
Start: 2023-03-02 | End: 2023-03-03 | Stop reason: HOSPADM

## 2023-03-02 RX ORDER — ONDANSETRON 2 MG/ML
4 INJECTION INTRAMUSCULAR; INTRAVENOUS EVERY 30 MIN PRN
Status: DISCONTINUED | OUTPATIENT
Start: 2023-03-02 | End: 2023-03-02 | Stop reason: HOSPADM

## 2023-03-02 RX ORDER — AMOXICILLIN 250 MG
1 CAPSULE ORAL 2 TIMES DAILY
Status: CANCELLED | OUTPATIENT
Start: 2023-03-02

## 2023-03-02 RX ORDER — ACETAMINOPHEN 325 MG/1
650 TABLET ORAL EVERY 4 HOURS PRN
Status: DISCONTINUED | OUTPATIENT
Start: 2023-03-05 | End: 2023-03-03 | Stop reason: HOSPADM

## 2023-03-02 RX ORDER — KETAMINE HYDROCHLORIDE 10 MG/ML
INJECTION INTRAMUSCULAR; INTRAVENOUS PRN
Status: DISCONTINUED | OUTPATIENT
Start: 2023-03-02 | End: 2023-03-02

## 2023-03-02 RX ORDER — HYDROMORPHONE HCL IN WATER/PF 6 MG/30 ML
0.4 PATIENT CONTROLLED ANALGESIA SYRINGE INTRAVENOUS EVERY 5 MIN PRN
Status: DISCONTINUED | OUTPATIENT
Start: 2023-03-02 | End: 2023-03-02 | Stop reason: HOSPADM

## 2023-03-02 RX ORDER — FENTANYL CITRATE 50 UG/ML
50 INJECTION, SOLUTION INTRAMUSCULAR; INTRAVENOUS
Status: DISCONTINUED | OUTPATIENT
Start: 2023-03-02 | End: 2023-03-02 | Stop reason: HOSPADM

## 2023-03-02 RX ORDER — ONDANSETRON 2 MG/ML
4 INJECTION INTRAMUSCULAR; INTRAVENOUS EVERY 6 HOURS PRN
Status: DISCONTINUED | OUTPATIENT
Start: 2023-03-02 | End: 2023-03-03 | Stop reason: HOSPADM

## 2023-03-02 RX ORDER — POLYETHYLENE GLYCOL 3350 17 G/17G
17 POWDER, FOR SOLUTION ORAL DAILY
Status: CANCELLED | OUTPATIENT
Start: 2023-03-03

## 2023-03-02 RX ORDER — HALOPERIDOL 5 MG/ML
1 INJECTION INTRAMUSCULAR
Status: DISCONTINUED | OUTPATIENT
Start: 2023-03-02 | End: 2023-03-02 | Stop reason: HOSPADM

## 2023-03-02 RX ORDER — PROPOFOL 10 MG/ML
INJECTION, EMULSION INTRAVENOUS PRN
Status: DISCONTINUED | OUTPATIENT
Start: 2023-03-02 | End: 2023-03-02

## 2023-03-02 RX ORDER — FENTANYL CITRATE 50 UG/ML
25 INJECTION, SOLUTION INTRAMUSCULAR; INTRAVENOUS EVERY 5 MIN PRN
Status: DISCONTINUED | OUTPATIENT
Start: 2023-03-02 | End: 2023-03-02 | Stop reason: HOSPADM

## 2023-03-02 RX ORDER — POLYETHYLENE GLYCOL 3350 17 G/17G
17 POWDER, FOR SOLUTION ORAL DAILY
Status: DISCONTINUED | OUTPATIENT
Start: 2023-03-03 | End: 2023-03-03 | Stop reason: HOSPADM

## 2023-03-02 RX ORDER — BISACODYL 10 MG
10 SUPPOSITORY, RECTAL RECTAL DAILY PRN
Status: DISCONTINUED | OUTPATIENT
Start: 2023-03-02 | End: 2023-03-03 | Stop reason: HOSPADM

## 2023-03-02 RX ORDER — CEFAZOLIN SODIUM/WATER 3 G/30 ML
3 SYRINGE (ML) INTRAVENOUS
Status: DISCONTINUED | OUTPATIENT
Start: 2023-03-02 | End: 2023-03-02 | Stop reason: HOSPADM

## 2023-03-02 RX ORDER — PIPERACILLIN SODIUM, TAZOBACTAM SODIUM 3; .375 G/15ML; G/15ML
3.38 INJECTION, POWDER, LYOPHILIZED, FOR SOLUTION INTRAVENOUS EVERY 8 HOURS
Status: CANCELLED | OUTPATIENT
Start: 2023-03-02

## 2023-03-02 RX ORDER — HYDROMORPHONE HCL IN WATER/PF 6 MG/30 ML
0.2 PATIENT CONTROLLED ANALGESIA SYRINGE INTRAVENOUS
Status: DISCONTINUED | OUTPATIENT
Start: 2023-03-02 | End: 2023-03-03 | Stop reason: HOSPADM

## 2023-03-02 RX ADMIN — ESMOLOL HYDROCHLORIDE 20 MG: 100 INJECTION, SOLUTION INTRAVENOUS at 10:53

## 2023-03-02 RX ADMIN — LIDOCAINE HYDROCHLORIDE 4 ML: 10 INJECTION, SOLUTION INFILTRATION; PERINEURAL at 10:16

## 2023-03-02 RX ADMIN — SODIUM CHLORIDE, POTASSIUM CHLORIDE, SODIUM LACTATE AND CALCIUM CHLORIDE: 600; 310; 30; 20 INJECTION, SOLUTION INTRAVENOUS at 09:02

## 2023-03-02 RX ADMIN — PIPERACILLIN AND TAZOBACTAM 3.38 G: 3; .375 INJECTION, POWDER, FOR SOLUTION INTRAVENOUS at 04:01

## 2023-03-02 RX ADMIN — ROCURONIUM BROMIDE 20 MG: 10 INJECTION, SOLUTION INTRAVENOUS at 11:33

## 2023-03-02 RX ADMIN — PIPERACILLIN AND TAZOBACTAM 3.38 G: 3; .375 INJECTION, POWDER, FOR SOLUTION INTRAVENOUS at 15:29

## 2023-03-02 RX ADMIN — MIDAZOLAM 2 MG: 1 INJECTION INTRAMUSCULAR; INTRAVENOUS at 10:05

## 2023-03-02 RX ADMIN — PIPERACILLIN AND TAZOBACTAM 3.38 G: 3; .375 INJECTION, POWDER, FOR SOLUTION INTRAVENOUS at 22:35

## 2023-03-02 RX ADMIN — ESMOLOL HYDROCHLORIDE 20 MG: 100 INJECTION, SOLUTION INTRAVENOUS at 10:50

## 2023-03-02 RX ADMIN — DEXAMETHASONE SODIUM PHOSPHATE 10 MG: 10 INJECTION, SOLUTION INTRAMUSCULAR; INTRAVENOUS at 10:26

## 2023-03-02 RX ADMIN — ESMOLOL HYDROCHLORIDE 20 MG: 100 INJECTION, SOLUTION INTRAVENOUS at 11:19

## 2023-03-02 RX ADMIN — FENTANYL CITRATE 100 MCG: 50 INJECTION, SOLUTION INTRAMUSCULAR; INTRAVENOUS at 10:16

## 2023-03-02 RX ADMIN — SODIUM CHLORIDE, POTASSIUM CHLORIDE, SODIUM LACTATE AND CALCIUM CHLORIDE: 600; 310; 30; 20 INJECTION, SOLUTION INTRAVENOUS at 11:08

## 2023-03-02 RX ADMIN — KETAMINE HYDROCHLORIDE 50 MG: 10 INJECTION, SOLUTION INTRAMUSCULAR; INTRAVENOUS at 10:24

## 2023-03-02 RX ADMIN — PROPOFOL 200 MG: 10 INJECTION, EMULSION INTRAVENOUS at 10:16

## 2023-03-02 RX ADMIN — LABETALOL HYDROCHLORIDE 10 MG: 5 INJECTION, SOLUTION INTRAVENOUS at 12:48

## 2023-03-02 RX ADMIN — LABETALOL HYDROCHLORIDE 10 MG: 5 INJECTION, SOLUTION INTRAVENOUS at 13:15

## 2023-03-02 RX ADMIN — AMLODIPINE BESYLATE 5 MG: 5 TABLET ORAL at 07:48

## 2023-03-02 RX ADMIN — HYDROMORPHONE HYDROCHLORIDE 1 MG: 1 INJECTION, SOLUTION INTRAMUSCULAR; INTRAVENOUS; SUBCUTANEOUS at 10:35

## 2023-03-02 RX ADMIN — HYDRALAZINE HYDROCHLORIDE 25 MG: 25 TABLET, FILM COATED ORAL at 17:01

## 2023-03-02 RX ADMIN — TRAMADOL HYDROCHLORIDE 50 MG: 50 TABLET, COATED ORAL at 20:00

## 2023-03-02 RX ADMIN — ACETAMINOPHEN 975 MG: 325 SOLUTION ORAL at 15:45

## 2023-03-02 RX ADMIN — ONDANSETRON 4 MG: 2 INJECTION INTRAMUSCULAR; INTRAVENOUS at 10:26

## 2023-03-02 RX ADMIN — ROCURONIUM BROMIDE 50 MG: 10 INJECTION, SOLUTION INTRAVENOUS at 10:16

## 2023-03-02 RX ADMIN — SUGAMMADEX 400 MG: 100 INJECTION, SOLUTION INTRAVENOUS at 11:51

## 2023-03-02 RX ADMIN — HYDROMORPHONE HYDROCHLORIDE 0.5 MG: 1 INJECTION, SOLUTION INTRAMUSCULAR; INTRAVENOUS; SUBCUTANEOUS at 03:50

## 2023-03-02 RX ADMIN — SENNOSIDES AND DOCUSATE SODIUM 1 TABLET: 50; 8.6 TABLET ORAL at 20:00

## 2023-03-02 RX ADMIN — ESMOLOL HYDROCHLORIDE 30 MG: 100 INJECTION, SOLUTION INTRAVENOUS at 11:05

## 2023-03-02 RX ADMIN — DEXTROSE AND SODIUM CHLORIDE: 5; 900 INJECTION, SOLUTION INTRAVENOUS at 01:50

## 2023-03-02 RX ADMIN — PROPOFOL 100 MCG/KG/MIN: 10 INJECTION, EMULSION INTRAVENOUS at 10:16

## 2023-03-02 ASSESSMENT — ACTIVITIES OF DAILY LIVING (ADL)
ADLS_ACUITY_SCORE: 35
WALKING_OR_CLIMBING_STAIRS_DIFFICULTY: NO
EATING/SWALLOWING: OTHER (SEE COMMENTS)
DOING_ERRANDS_INDEPENDENTLY_DIFFICULTY: NO
ADLS_ACUITY_SCORE: 35
EATING: 0-->INDEPENDENT
DRESSING/BATHING_DIFFICULTY: NO
ADLS_ACUITY_SCORE: 35
DIFFICULTY_EATING/SWALLOWING: YES
ADLS_ACUITY_SCORE: 35
ADLS_ACUITY_SCORE: 35
CONCENTRATING,_REMEMBERING_OR_MAKING_DECISIONS_DIFFICULTY: NO
ADLS_ACUITY_SCORE: 35
SWALLOWING: 0-->SWALLOWS FOODS/LIQUIDS WITHOUT DIFFICULTY (DEVELOPMENTALLY APPROPRIATE)
SWALLOWING: 0-->SWALLOWS FOODS/LIQUIDS WITHOUT DIFFICULTY
EATING: 0-->INDEPENDENT
CHANGE_IN_FUNCTIONAL_STATUS_SINCE_ONSET_OF_CURRENT_ILLNESS/INJURY: NO
WEAR_GLASSES_OR_BLIND: NO
ADLS_ACUITY_SCORE: 35
ADLS_ACUITY_SCORE: 35
FALL_HISTORY_WITHIN_LAST_SIX_MONTHS: NO
ADLS_ACUITY_SCORE: 22
TOILETING_ISSUES: NO
ADLS_ACUITY_SCORE: 35

## 2023-03-02 NOTE — PROVIDER NOTIFICATION
Updated Anesthesia MD Hsieh, Pt 20 mins post Labetalol  SBPs upper 170's over 98 (see Epic Flowsheet)  Per Dr. Hsieh, give addition 10mg IV Labetalol per PRN order and monitor for 20 mins post dose prior to transferring to holding or floor.

## 2023-03-02 NOTE — PLAN OF CARE
"Goal Outcome Evaluation:       A&Ox4, able to make needs known. Complained of abdominal pain 1x, dilaudid IV provided per PRN order, effective. D5NS restarted for patient around 0200, additional IV access obtained for 4hr zosyn infusion. Using RA. BP elevated, see flowsheets for details. Most recent vitals:  BP (!) 182/101 (BP Location: Left arm, Patient Position: Supine, Cuff Size: Adult Large)   Pulse 90   Temp 98.2  F (36.8  C) (Oral)   Resp 18   Ht 1.727 m (5' 8\")   Wt 131.5 kg (290 lb)   LMP 02/16/2023   SpO2 97%   BMI 44.09 kg/m       Problem: Pain Acute  Goal: Optimal Pain Control and Function  Outcome: Progressing     Problem: Cholecystectomy  Goal: Fluid and Electrolyte Balance  Outcome: Progressing  Goal: Absence of Infection Signs and Symptoms  Outcome: Progressing                    "

## 2023-03-02 NOTE — ANESTHESIA CARE TRANSFER NOTE
Patient: Sam Morales    Procedure: Procedure(s):  CHOLECYSTECTOMY, LAPAROSCOPIC  with cholangiograms       Diagnosis: Acute cholecystitis [K81.0]  Diagnosis Additional Information: No value filed.    Anesthesia Type:   General     Note:    Oropharynx: oropharynx clear of all foreign objects and spontaneously breathing  Level of Consciousness: drowsy  Oxygen Supplementation: face mask  Level of Supplemental Oxygen (L/min / FiO2): 8  Independent Airway: airway patency satisfactory and stable  Dentition: dentition unchanged  Vital Signs Stable: post-procedure vital signs reviewed and stable  Report to RN Given: handoff report given  Patient transferred to: PACU    Handoff Report: Identifed the Patient, Identified the Reponsible Provider, Reviewed the pertinent medical history, Discussed the surgical course, Reviewed Intra-OP anesthesia mangement and issues during anesthesia, Set expectations for post-procedure period and Allowed opportunity for questions and acknowledgement of understanding      Vitals:  Vitals Value Taken Time   /97 03/02/23 1211   Temp 36.3  C (97.3  F) 03/02/23 1209   Pulse 90 03/02/23 1213   Resp 22 03/02/23 1213   SpO2 97 % 03/02/23 1213   Vitals shown include unvalidated device data.    Electronically Signed By: DAMIEN Garcia CRNA  March 2, 2023  12:15 PM

## 2023-03-02 NOTE — OP NOTE
Operative Report:    Name:  Sam Morales  PCP:  System, Provider Not In  Procedure Date:  3/1/2023 - 3/2/2023      Procedure(s):  CHOLECYSTECTOMY, LAPAROSCOPIC  with cholangiograms    Pre-Procedure Diagnosis:  Acute cholecystitis [K81.0]     Post-Procedure Diagnosis:    * No post-op diagnosis entered *    Surgeon(s):  Mayco Joseph DO    Circulator: Denia Urrutia RN; Trudi Fernandez RN  Scrub Person: HECTOR HANCOCK  X-Ray Technologist: Pita Weinstein ARRT    Anesthesia Type:  GET      Findings:  choledocholithiasis    Operative Report:    The patient was taken to the operating room and placed in a supine position.  Endotracheal intubation was performed.  SCDs were placed on bilateral lower extremities.  Antibiotics were given prior to skin incision.  The abdomen was prepped and draped in a sterile fashion.    I began the first portion of the procedure by injecting quarter percent Marcaine with epinephrine into the supraumbilical position.  I then made a 5 mm transverse incision above the umbilicus and established a pneumoperitoneum using a Veress needle technique.  Once the pneumoperitoneum was established,I placed a 5 mm trocar with a 5 mm 30  camera into the abdomen.  The surrounding structures were scrutinized for any injuries upon entry.  I did not find any. I placed an 11 mm trocar in the subxiphoid position as well as 2 right upper quadrant 5 mm trochars under direct visualization.  All trochars were placed after local anesthetic was injected to the fascial layers.    I first spent quite some time taking down the adhesions surrounding the gallbladder.  There were thick fibrous adhesions indicative of chronic cholecystitis.  Eventually I was able to dissect out the gallbladder.  I then had my assistant retract the gallbladder cephalad and I persisted to dissect out the cystic duct and cystic artery in the standard fashion using blunt dissection and Bovie electrocautery.  Once I obtained a  critical view I then placed a 10 mm clip across the cystic duct and gallbladder infundibulum junction.  I then made a ductotomy.  There was noted to be purulence emanating from the gallbladder and the cystic duct indicative of cholangitis.  I placed a cholangiocatheter into the abdomen via a 14-gauge angiocatheter.  This was secured into the cystic duct with clips.  The cholangiogram was shot and demonstrated distal filling defect with no filling of contrast into the duodenum.  At this point the cholangiogram was complete.  I then removed the cholangiocatheter and placed several 10 mm titanium clips on the cystic duct 3 distally and one at the gallbladder and infundibulum junction.  I also placed a 0 Vicryl Endoloop around the cystic duct as well.  I then placed 2 clips across the cystic artery.  Next I transected both the cystic duct and cystic artery with sharp dissection and removed the gallbladder off the gallbladder fossa using Bovie electrocautery.  I then placed the gallbladder into an Endo Catch bag and brought it out through the subxiphoid port site incision.  Next I achieved hemostasis using Bovie electrocautery and scrutinized the surgical area for any ongoing bleeding.  None was found.  I placed a 15 Juliocesar drain to the right upper quadrant 5 mm trocar and sutured this to the skin with a drain stitch.  I then closed the 11 mm subxiphoid port site incision using a Clara needle and an 0 Vicryl suture.  Once this was complete I removed all trochars and desufflated the abdomen.  I then injected local anesthetic and all fascial layers and reapproximated the skin edges of all 4 trocar sites with 4-0 Monocryl subcuticular sutures.  The wounds were then cleaned and covered with dry sterile dressings.  All sponge counts and needle counts were correct at the end of the procedure.    Estimated Blood Loss:   5cc    Specimens:    ID Type Source Tests Collected by Time Destination   1 : gallbladder with stones Tissue  Gallbladder with Stone(s) SURGICAL PATHOLOGY EXAM Mayco Joseph DO 3/2/2023 11:37 AM           Drains:   Closed/Suction Drain 1 Right Abdomen Bulb 15 Tuvaluan (Active)       Complications:    None    Mayco Joseph DO

## 2023-03-02 NOTE — PROVIDER NOTIFICATION
Notified Anesthesia Dr. Hsieh pt remains hypertensive in PACU  180's/100's.  Asking for parameters or PRNs to treat.  Provider will come see pt.

## 2023-03-02 NOTE — PROGRESS NOTES
"[facility]    Medicine Progress Note - Hospitalist Service    Date of Admission:  3/1/2023    Assessment & Plan   Sam Morales is a 37 year old female admitted with acute cholecystitis.  MRCP did not demonstrate choledocholithiasis. She is afebrile and currently clinically stable.  Awaiting operative intervention with general surgery.  Start amlodipine for hypertension.    Her lower chest discomfort is not typical of ACS given the reproducibility on exam.  It is likely associated with the acute cholecystitis.  Will repeat an EKG.    # Acute cholecystitis  - pip/tazo  - NPO  - EKG     # Hypertensive urgency  - IV hydralazine for SBP >200  - start amlodipine          Diet: NPO per Anesthesia Guidelines for Procedure/Surgery Except for: Meds    DVT Prophylaxis: held for surgery  Crooks Catheter: Not present  Lines: None     Cardiac Monitoring: None  Code Status: Full Code      Clinically Significant Risk Factors Present on Admission                       # Severe Obesity: Estimated body mass index is 44.09 kg/m  as calculated from the following:    Height as of this encounter: 1.727 m (5' 8\").    Weight as of this encounter: 131.5 kg (290 lb).           Disposition Plan      Expected Discharge Date: 03/03/2023                  Trevor Ascencio MD  Hospitalist Service  [facility]  Securely message with [employer] (more info)  Text page via [employer] Paging/Directory   ______________________________________________________________________    Interval History   Has a mild headache.  Mild nausea coming back from MRI, which has since resolved.  No vomiting.  No blurry or double vision.  Reports lower chest pressure that is associated with soreness extending into her epigastrium.  She otherwise has no abdominal pain. No fevers or chills.    Physical Exam   Vital Signs: Temp: 98.2  F (36.8  C) Temp src: Oral BP: (!) 182/101 Pulse: 90   Resp: 18 SpO2: 97 % O2 Device: None (Room " air)    Weight: 290 lbs 0 oz    Gen:  Lying comfortably in bed, nad  CV:  Nl rate, regular rhythm  Pulm:  No acute resp distress, ctab anteriorly  GI:  Abdomen soft, non distended, RUQ and epigastric TTP with discomfort in lower chest reproduced on palpation    Medical Decision Making       15 MINUTES SPENT BY ME on the date of service doing chart review, history, exam, documentation & further activities per the note.      Data   WBC 8.8  Hgb 11  Plts 232    MRCP                                                                   IMPRESSION:  1.  Gallstones, biliary sludge, mild diffuse gallbladder wall thickening and slight pericholecystic inflammatory change concerning for acute cholecystitis.  2.  Extrahepatic bile ducts near upper limits of normal, no intraductal stone identified.  3.  Mild splenomegaly.    Addendum:  EKG (my read) - normal sinus rhythm, no acute ischemic changes

## 2023-03-02 NOTE — CONSULTS
HPI  37 year old year old female who I have been consulted by No ref. provider found for evaluation of Chest Pain  37-year-old female with a 1 week history of epigastric and right upper quadrant pain with associated nausea and vomiting.  She had pain that started spontaneously 1 week ago then subsided and returned several days later.  Denies any dark-colored urine or acholic stool.  Denies any fevers or chills but had persistent epigastric and right upper quadrant pain.  Subsequently evaluated the emergency room and noted to have LFTs that were elevated.  MRCP was relatively unremarkable except for possible cholecystitis.  Currently she feels somewhat better but still has right upper quadrant discomfort.  She has never had any symptoms of biliary colic in the past.      Allergies:Patient has no known allergies.    No past medical history on file.    No past surgical history on file.      CURRENT MEDS:    Current Facility-Administered Medications:      amLODIPine (NORVASC) tablet 5 mg, 5 mg, Oral, Daily, Trevor Ascencio MD, 5 mg at 03/02/23 0748     dextrose 5% and 0.9% NaCl infusion, , Intravenous, Continuous, Trevor Ascencio MD, Last Rate: 100 mL/hr at 03/02/23 0801, Rate Verify at 03/02/23 0801     hydrALAZINE (APRESOLINE) injection 5 mg, 5 mg, Intravenous, Q6H PRN, Trevor Ascencio MD     HYDROmorphone (PF) (DILAUDID) injection 0.5 mg, 0.5 mg, Intravenous, Q3H PRN, Trevor Ascencio MD, 0.5 mg at 03/02/23 0350     lidocaine (LMX4) cream, , Topical, Q1H PRN, Trevor Ascencio MD     lidocaine 1 % 0.1-1 mL, 0.1-1 mL, Other, Q1H PRN, Trevor Ascencio MD     melatonin tablet 1 mg, 1 mg, Oral, At Bedtime PRN, Trevor Ascencio MD     ondansetron (ZOFRAN) injection 4 mg, 4 mg, Intravenous, Q6H PRN, Trevor Ascencio MD, 4 mg at 03/01/23 1844     piperacillin-tazobactam (ZOSYN) 3.375 g vial to attach to  mL bag, 3.375 g, Intravenous, Q8H, Trevor Ascencio MD, Last Rate: 0 mL/hr at 03/02/23 0030, Rate Verify at  "03/02/23 0658     sodium chloride (PF) 0.9% PF flush 3 mL, 3 mL, Intracatheter, Q8H, Trevor Ascencio MD, 3 mL at 03/02/23 0159     sodium chloride (PF) 0.9% PF flush 3 mL, 3 mL, Intracatheter, q1 min prn, Trevor Ascencio MD    Current Outpatient Medications:      acetaminophen (TYLENOL) 500 MG tablet, Take 500-1,000 mg by mouth every 6 hours as needed for mild pain, Disp: , Rfl:      omeprazole (PRILOSEC) 20 MG DR capsule, Take 20 mg by mouth daily as needed, Disp: , Rfl:     FAMHX-reviewed and noncontributory     reports that she has never smoked. She has never used smokeless tobacco.    Review of Systems:  The 12 point review of systems  is within normal limits except for as mentioned above in the HPI.  General ROS: No complaints or constitutional symptoms  Ophthalmic ROS: No complaints of visual changes  Skin: No complaints or symptoms   Endocrine: No complaints or symptoms  Hematologic/Lymphatic: No symptoms or complaints  Psychiatric: No symptoms or complaints  Respiratory ROS: no cough, shortness of breath, or wheezing  Cardiovascular ROS: no chest pain or dyspnea on exertion  Gastrointestinal ROS: As per HPI  Genito-Urinary ROS: no dysuria, trouble voiding, or hematuria  Musculoskeletal ROS: no joint or muscle pain  Neurological ROS: no TIA or stroke symptoms      EXAM:  BP (!) 190/100 (BP Location: Left arm, Cuff Size: Adult Large)   Pulse 92   Temp 98.6  F (37  C) (Oral)   Resp 18   Ht 1.727 m (5' 8\")   Wt 131.5 kg (290 lb)   LMP 02/16/2023   SpO2 96%   BMI 44.09 kg/m    GENERAL: Well developed female, No acute distress, pleasant and conversant   EYES: Pupils equal, round and reactive, no scleral icterus  ABDOMEN: Soft, tender to palpation right upper quadrant with no evidence of a Brown sign  SKIN: Pink, warm and dry, no obvious rashes or lesions   NEURO:No focal deficits, ambulatory  MUSCULOSKELETAL:No obvious deformities, no swelling, normal appearing      LABS:  Lab Results   Component Value " Date    WBC 8.8 03/02/2023    HGB 10.7 03/02/2023    HCT 35.2 03/02/2023    MCV 81 03/02/2023     03/02/2023     INR/Prothrombin Time  Recent Labs   Lab 03/02/23  0644      CO2 25   BUN 6*     Lab Results   Component Value Date     (H) 03/02/2023     (H) 03/02/2023    ALKPHOS 277 (H) 03/02/2023       IMAGES:   Relevant images were reviewed and discussed with the patient.  Notable findings were: MRCP noted    Assessment/Plan:   Sam Morales is a 37 year old female with cholelithiasis, cholecystitis and likely passed a small gallstone.  She does have small choleliths within the gallbladder and has mild tenderness indicative of acute on chronic mild cholecystitis.  At this point her LFTs are slowly trending down although her bilirubin is slightly elevated.  This may just be residual from her recently passed gallstone.  The plan will be for laparoscopic cholecystectomy with intraoperative cholangiograms.  I discussed the risk and benefits of the surgery with the patient in detail and she has consented to proceed.  Should the cholangiogram to be positive then we will plan on discussing the case with gastroenterology for transfer to [facility] for ERCP.      Brandon Joseph D.O. FACS  [phone]

## 2023-03-02 NOTE — PROGRESS NOTES
Brief GI progress note  3/2/2023    Cholangitis/choledocholithiasis noted during today's cholecystectomy with IOC.  Plan for transfer to [facility] inpatient service for ERCP, however no hospital beds are available.  Instead, we will plan to transfer tomorrow to [facility] just for the ERCP, then back to [facility], assuming no hospital beds open up at [facility] in the interim.  She is tentatively on the schedule for 1:10 PM.  This means she should be to [facility] at 11 AM tomorrow 3/3/2023.    Nixon Rao MD  [facility]  [phone] or if after 5PM [phone]

## 2023-03-02 NOTE — PLAN OF CARE
Problem: Plan of Care - These are the overarching goals to be used throughout the patient stay.    Goal: Optimal Comfort and Wellbeing  Outcome: Adequate for Care Transition  Intervention: Monitor Pain and Promote Comfort  Recent Flowsheet Documentation  Taken 3/2/2023 3780 by Amber De León, RN  Pain Management Interventions:    medication (see MAR)    aromatherapy   Goal Outcome Evaluation:  Pt pleasant and cooperative throughout this shift.  Minimal reports of pain.  She has difficulty swallowing pills, but was able to swallow her Hydralazine.  Lap sites remain CDI.  IV infusing.  NPO at midnight - pt aware.  Transferring to [facility] transportation at 0800 for an ERCP at 1300 and will return with transport at 1700.

## 2023-03-02 NOTE — ANESTHESIA POSTPROCEDURE EVALUATION
Patient: Sma Morales    Procedure: Procedure(s):  CHOLECYSTECTOMY, LAPAROSCOPIC  with cholangiograms       Anesthesia Type:  General    Note:  Disposition: Inpatient   Postop Pain Control: Uneventful            Sign Out: Well controlled pain   PONV: No   Neuro/Psych: Uneventful            Sign Out: Acceptable/Baseline neuro status   Airway/Respiratory: Uneventful            Sign Out: Acceptable/Baseline resp. status   CV/Hemodynamics: Uneventful            Sign Out: Acceptable CV status; No obvious hypovolemia; No obvious fluid overload   Other NRE: NONE   DID A NON-ROUTINE EVENT OCCUR? No    Event details/Postop Comments:  Newly diagnosed HTN on hospital admission with SBP in 200s.  Labetalol in PACU and PRN hydralazine available to maintain SBP<180.           Last vitals:  Vitals Value Taken Time   /90 03/02/23 1354   Temp 36.3  C (97.3  F) 03/02/23 1209   Pulse 82 03/02/23 1354   Resp 20 03/02/23 1346   SpO2 99 % 03/02/23 1354   Vitals shown include unvalidated device data.    Electronically Signed By: Kristina Hsieh MD  March 2, 2023  1:56 PM

## 2023-03-02 NOTE — PROVIDER NOTIFICATION
Anesthesia Dr. Hsieh here to see pt.  Second dose of Hydralazine given 20 mins ago.  /93.  Per Dr. Hsieh, will write for order for PRN Hydralazine SBP > 180.  At this time she is okay with transfer to the floor or monitor in holding.  Hospitalist is following and will continue to address.   Awaiting bed availability.  Will transfer to PACU holding room.

## 2023-03-02 NOTE — ANESTHESIA PREPROCEDURE EVALUATION
Anesthesia Pre-Procedure Evaluation    Patient: Sam Morales   MRN: [id number] : 1985        Procedure : Procedure(s):  CHOLECYSTECTOMY, LAPAROSCOPIC  with cholangiograms          Past Medical History:   Diagnosis Date     Hypertension      Motion sickness      Obese       History reviewed. No pertinent surgical history.   No Known Allergies   Social History     Tobacco Use     Smoking status: Never     Smokeless tobacco: Never   Substance Use Topics     Alcohol use: Yes     Comment: ocassionally maybe 1x month      Wt Readings from Last 1 Encounters:   23 131.5 kg (290 lb)        Anesthesia Evaluation   Pt has not had prior anesthetic         ROS/MED HX  ENT/Pulmonary:  - neg pulmonary ROS     Neurologic:  - neg neurologic ROS     Cardiovascular:     (+) hypertension-----    METS/Exercise Tolerance:     Hematologic:  - neg hematologic  ROS     Musculoskeletal:  - neg musculoskeletal ROS     GI/Hepatic:     (+) GERD, cholecystitis/cholelithiasis (elevated LFTs),     Renal/Genitourinary:  - neg Renal ROS     Endo:     (+) Obesity,     Psychiatric/Substance Use:  - neg psychiatric ROS     Infectious Disease:       Malignancy:       Other:            Physical Exam    Airway        Mallampati: II   TM distance: > 3 FB   Neck ROM: full   Mouth opening: > 3 cm    Respiratory Devices and Support         Dental     Comment: Good teeth        Cardiovascular          Rhythm and rate: regular and normal     Pulmonary           breath sounds clear to auscultation           OUTSIDE LABS:  CBC:   Lab Results   Component Value Date    WBC 8.8 2023    WBC 8.0 2023    HGB 10.7 (L) 2023    HGB 11.9 2023    HCT 35.2 2023    HCT 38.0 2023     2023     2023     BMP:   Lab Results   Component Value Date     2023     2023    POTASSIUM 3.8 2023    POTASSIUM 3.9 2023    CHLORIDE 108 (H) 2023    CHLORIDE 105 2023     CO2 25 03/02/2023    CO2 23 03/01/2023    BUN 6 (L) 03/02/2023    BUN 9 03/01/2023    CR 1.00 03/02/2023    CR 1.04 03/01/2023     (H) 03/02/2023     03/01/2023     COAGS: No results found for: PTT, INR, FIBR  POC:   Lab Results   Component Value Date    HCGS Negative 03/01/2023     HEPATIC:   Lab Results   Component Value Date    ALBUMIN 3.1 (L) 03/02/2023    PROTTOTAL 6.0 03/02/2023     (H) 03/02/2023     (H) 03/02/2023    ALKPHOS 277 (H) 03/02/2023    BILITOTAL 2.7 (H) 03/02/2023     OTHER:   Lab Results   Component Value Date    RANDY 8.8 03/02/2023    LIPASE 24 03/01/2023       Anesthesia Plan    ASA Status:  3      Anesthesia Type: General.     - Airway: ETT              Consents    Anesthesia Plan(s) and associated risks, benefits, and realistic alternatives discussed. Questions answered and patient/representative(s) expressed understanding.     - Discussed: Risks, Benefits and Alternatives for BOTH SEDATION and the PROCEDURE were discussed     - Discussed with:  Patient         Postoperative Care            Comments:                Kristina Hsieh MD

## 2023-03-02 NOTE — ED NOTES
Patient a&o, x3. Patient laying with daughter at bedside. Patient reassed for blood pressure, no PRN needed at present time. Patient voices no concerns at present time.

## 2023-03-03 ENCOUNTER — ANESTHESIA (OUTPATIENT)
Dept: SURGERY | Facility: HOSPITAL | Age: 38
End: 2023-03-03
Payer: COMMERCIAL

## 2023-03-03 ENCOUNTER — HOSPITAL ENCOUNTER (OUTPATIENT)
Facility: HOSPITAL | Age: 38
Discharge: OTHER INSTITUTION | End: 2023-03-03
Attending: INTERNAL MEDICINE | Admitting: INTERNAL MEDICINE
Payer: COMMERCIAL

## 2023-03-03 ENCOUNTER — APPOINTMENT (OUTPATIENT)
Dept: RADIOLOGY | Facility: HOSPITAL | Age: 38
End: 2023-03-03
Attending: INTERNAL MEDICINE
Payer: COMMERCIAL

## 2023-03-03 ENCOUNTER — HOSPITAL ENCOUNTER (INPATIENT)
Facility: CLINIC | Age: 38
LOS: 2 days | Discharge: HOME | DRG: 418 | End: 2023-03-05
Attending: HOSPITALIST | Admitting: HOSPITALIST
Payer: COMMERCIAL

## 2023-03-03 VITALS
HEART RATE: 95 BPM | TEMPERATURE: 98.8 F | RESPIRATION RATE: 18 BRPM | WEIGHT: 289.9 LBS | DIASTOLIC BLOOD PRESSURE: 96 MMHG | SYSTOLIC BLOOD PRESSURE: 183 MMHG | OXYGEN SATURATION: 95 % | HEIGHT: 68 IN | BODY MASS INDEX: 43.94 KG/M2

## 2023-03-03 VITALS
RESPIRATION RATE: 24 BRPM | DIASTOLIC BLOOD PRESSURE: 88 MMHG | BODY MASS INDEX: 44.09 KG/M2 | OXYGEN SATURATION: 94 % | WEIGHT: 290 LBS | SYSTOLIC BLOOD PRESSURE: 169 MMHG | TEMPERATURE: 99 F | HEART RATE: 98 BPM

## 2023-03-03 DIAGNOSIS — R03.0 ELEVATED BLOOD PRESSURE READING WITHOUT DIAGNOSIS OF HYPERTENSION: ICD-10-CM

## 2023-03-03 DIAGNOSIS — K80.43 CHOLEDOCHOLITHIASIS WITH ACUTE CHOLECYSTITIS WITH OBSTRUCTION: ICD-10-CM

## 2023-03-03 DIAGNOSIS — K81.0 ACUTE CHOLECYSTITIS: Primary | ICD-10-CM

## 2023-03-03 DIAGNOSIS — I10 ESSENTIAL HYPERTENSION: ICD-10-CM

## 2023-03-03 DIAGNOSIS — R79.89 ELEVATED LFTS: ICD-10-CM

## 2023-03-03 LAB
ALBUMIN SERPL-MCNC: 3 G/DL (ref 3.5–5)
ALP SERPL-CCNC: 288 U/L (ref 45–120)
ALT SERPL W P-5'-P-CCNC: 645 U/L (ref 0–45)
ANION GAP SERPL CALCULATED.3IONS-SCNC: 12 MMOL/L (ref 5–18)
AST SERPL W P-5'-P-CCNC: 238 U/L (ref 0–40)
BILIRUB SERPL-MCNC: 3.9 MG/DL (ref 0–1)
BUN SERPL-MCNC: 7 MG/DL (ref 8–22)
CALCIUM SERPL-MCNC: 9 MG/DL (ref 8.5–10.5)
CHLORIDE BLD-SCNC: 107 MMOL/L (ref 98–107)
CO2 SERPL-SCNC: 21 MMOL/L (ref 22–31)
CREAT SERPL-MCNC: 1 MG/DL (ref 0.6–1.1)
ERCP: NORMAL
ERYTHROCYTE [DISTWIDTH] IN BLOOD BY AUTOMATED COUNT: 16.2 % (ref 10–15)
GFR SERPL CREATININE-BSD FRML MDRD: 73 ML/MIN/1.73M2
GLUCOSE BLD-MCNC: 139 MG/DL (ref 70–125)
GLUCOSE BLDC GLUCOMTR-MCNC: 114 MG/DL (ref 70–99)
HCT VFR BLD AUTO: 34.4 % (ref 35–47)
HGB BLD-MCNC: 10.7 G/DL (ref 11.7–15.7)
MCH RBC QN AUTO: 24.4 PG (ref 26.5–33)
MCHC RBC AUTO-ENTMCNC: 31.1 G/DL (ref 31.5–36.5)
MCV RBC AUTO: 79 FL (ref 78–100)
PLATELET # BLD AUTO: 260 10E3/UL (ref 150–450)
POTASSIUM BLD-SCNC: 3.5 MMOL/L (ref 3.5–5)
PROT SERPL-MCNC: 6 G/DL (ref 6–8)
RBC # BLD AUTO: 4.38 10E6/UL (ref 3.8–5.2)
SODIUM SERPL-SCNC: 140 MMOL/L (ref 136–145)
WBC # BLD AUTO: 11.1 10E3/UL (ref 4–11)

## 2023-03-03 PROCEDURE — 272N000006 HC INTERCOMPANY SERVICE, SUPPLY 272 OPNP

## 2023-03-03 PROCEDURE — 250N000011 HC RX IP 250 OP 636: Performed by: ANESTHESIOLOGY

## 2023-03-03 PROCEDURE — 120N000001 HC R&B MED SURG/OB

## 2023-03-03 PROCEDURE — 250N000013 HC RX MED GY IP 250 OP 250 PS 637: Performed by: HOSPITALIST

## 2023-03-03 PROCEDURE — 250N000007 HC INTERCOMPANY SERVICE, DRUGS 250 OPNP

## 2023-03-03 PROCEDURE — 0FC98ZZ EXTIRPATION OF MATTER FROM COMMON BILE DUCT, VIA NATURAL OR ARTIFICIAL OPENING ENDOSCOPIC: ICD-10-PCS | Performed by: INTERNAL MEDICINE

## 2023-03-03 PROCEDURE — C1726 CATH, BAL DIL, NON-VASCULAR: HCPCS | Performed by: INTERNAL MEDICINE

## 2023-03-03 PROCEDURE — 250N000011 HC RX IP 250 OP 636: Performed by: SURGERY

## 2023-03-03 PROCEDURE — 250N000011 HC RX IP 250 OP 636: Performed by: NURSE ANESTHETIST, CERTIFIED REGISTERED

## 2023-03-03 PROCEDURE — 370N000017 HC ANESTHESIA TECHNICAL FEE, PER MIN: Performed by: INTERNAL MEDICINE

## 2023-03-03 PROCEDURE — 250N000011 HC RX IP 250 OP 636

## 2023-03-03 PROCEDURE — 258N000003 HC RX IP 258 OP 636: Performed by: ANESTHESIOLOGY

## 2023-03-03 PROCEDURE — 250N000025 HC SEVOFLURANE, PER MIN: Performed by: INTERNAL MEDICINE

## 2023-03-03 PROCEDURE — 999N000141 HC STATISTIC PRE-PROCEDURE NURSING ASSESSMENT: Performed by: INTERNAL MEDICINE

## 2023-03-03 PROCEDURE — 99232 SBSQ HOSP IP/OBS MODERATE 35: CPT | Performed by: HOSPITALIST

## 2023-03-03 PROCEDURE — 36415 COLL VENOUS BLD VENIPUNCTURE: CPT | Performed by: HOSPITALIST

## 2023-03-03 PROCEDURE — 272N000001 HC OR GENERAL SUPPLY STERILE: Performed by: INTERNAL MEDICINE

## 2023-03-03 PROCEDURE — 250N000011 HC RX IP 250 OP 636: Performed by: HOSPITALIST

## 2023-03-03 PROCEDURE — 360N000082 HC SURGERY LEVEL 2 W/ FLUORO, PER MIN: Performed by: INTERNAL MEDICINE

## 2023-03-03 PROCEDURE — 360N000006 HC INTERCOMPANY SERVICE, OPERATIVE 360 OPNP

## 2023-03-03 PROCEDURE — 710N000013 HC INTERCOMPANY SERVICE, RECOVERY 710 OPNP

## 2023-03-03 PROCEDURE — 636N000001 HC INTERCOMPANY SERVICE, DRUGS DETAILED 636 OPNP

## 2023-03-03 PROCEDURE — 710N000009 HC RECOVERY PHASE 1, LEVEL 1, PER MIN: Performed by: INTERNAL MEDICINE

## 2023-03-03 PROCEDURE — 250N000009 HC RX 250: Performed by: NURSE ANESTHETIST, CERTIFIED REGISTERED

## 2023-03-03 PROCEDURE — 250N000013 HC RX MED GY IP 250 OP 250 PS 637: Performed by: SURGERY

## 2023-03-03 PROCEDURE — C1769 GUIDE WIRE: HCPCS | Performed by: INTERNAL MEDICINE

## 2023-03-03 PROCEDURE — 250N000009 HC RX 250: Performed by: ANESTHESIOLOGY

## 2023-03-03 PROCEDURE — 80053 COMPREHEN METABOLIC PANEL: CPT | Performed by: HOSPITALIST

## 2023-03-03 PROCEDURE — 85027 COMPLETE CBC AUTOMATED: CPT | Performed by: HOSPITALIST

## 2023-03-03 PROCEDURE — 999N000180 XR SURGERY CARM FLUORO LESS THAN 5 MIN

## 2023-03-03 PROCEDURE — 250N000009 HC RX 250

## 2023-03-03 PROCEDURE — 370N000020 HC INTERCOMPANY SERVICE, ANESTHESIA 370 OPNP

## 2023-03-03 PROCEDURE — 255N000002 HC RX 255 OP 636: Performed by: INTERNAL MEDICINE

## 2023-03-03 RX ORDER — POLYETHYLENE GLYCOL 3350 17 G/17G
17 POWDER, FOR SOLUTION ORAL DAILY
Status: DISCONTINUED | OUTPATIENT
Start: 2023-03-03 | End: 2023-03-05 | Stop reason: HOSPADM

## 2023-03-03 RX ORDER — HYDROMORPHONE HCL IN WATER/PF 6 MG/30 ML
0.4 PATIENT CONTROLLED ANALGESIA SYRINGE INTRAVENOUS EVERY 4 HOURS PRN
Status: DISCONTINUED | OUTPATIENT
Start: 2023-03-03 | End: 2023-03-05 | Stop reason: HOSPADM

## 2023-03-03 RX ORDER — LIDOCAINE 40 MG/G
CREAM TOPICAL
Status: DISCONTINUED | OUTPATIENT
Start: 2023-03-03 | End: 2023-03-03 | Stop reason: HOSPADM

## 2023-03-03 RX ORDER — NALOXONE HYDROCHLORIDE 0.4 MG/ML
0.4 INJECTION, SOLUTION INTRAMUSCULAR; INTRAVENOUS; SUBCUTANEOUS
Status: CANCELLED | OUTPATIENT
Start: 2023-03-03

## 2023-03-03 RX ORDER — NALOXONE HYDROCHLORIDE 0.4 MG/ML
0.2 INJECTION, SOLUTION INTRAMUSCULAR; INTRAVENOUS; SUBCUTANEOUS
Status: DISCONTINUED | OUTPATIENT
Start: 2023-03-03 | End: 2023-03-05 | Stop reason: HOSPADM

## 2023-03-03 RX ORDER — OXYCODONE HYDROCHLORIDE 5 MG/1
5 TABLET ORAL
Status: DISCONTINUED | OUTPATIENT
Start: 2023-03-03 | End: 2023-03-03 | Stop reason: HOSPADM

## 2023-03-03 RX ORDER — SODIUM CHLORIDE, SODIUM LACTATE, POTASSIUM CHLORIDE, CALCIUM CHLORIDE 600; 310; 30; 20 MG/100ML; MG/100ML; MG/100ML; MG/100ML
INJECTION, SOLUTION INTRAVENOUS CONTINUOUS
Status: CANCELLED | OUTPATIENT
Start: 2023-03-03

## 2023-03-03 RX ORDER — OXYCODONE HYDROCHLORIDE 5 MG/1
5 TABLET ORAL
Status: CANCELLED | OUTPATIENT
Start: 2023-03-03

## 2023-03-03 RX ORDER — NALOXONE HYDROCHLORIDE 0.4 MG/ML
0.2 INJECTION, SOLUTION INTRAMUSCULAR; INTRAVENOUS; SUBCUTANEOUS
Status: CANCELLED | OUTPATIENT
Start: 2023-03-03

## 2023-03-03 RX ORDER — PROPOFOL 10 MG/ML
INJECTION, EMULSION INTRAVENOUS PRN
Status: DISCONTINUED | OUTPATIENT
Start: 2023-03-03 | End: 2023-03-03

## 2023-03-03 RX ORDER — FENTANYL CITRATE 50 UG/ML
25 INJECTION, SOLUTION INTRAMUSCULAR; INTRAVENOUS EVERY 5 MIN PRN
Status: DISCONTINUED | OUTPATIENT
Start: 2023-03-03 | End: 2023-03-03 | Stop reason: HOSPADM

## 2023-03-03 RX ORDER — DEXAMETHASONE SODIUM PHOSPHATE 10 MG/ML
INJECTION, SOLUTION INTRAMUSCULAR; INTRAVENOUS PRN
Status: DISCONTINUED | OUTPATIENT
Start: 2023-03-03 | End: 2023-03-03

## 2023-03-03 RX ORDER — OXYCODONE HYDROCHLORIDE 5 MG/1
10 TABLET ORAL
Status: CANCELLED | OUTPATIENT
Start: 2023-03-03

## 2023-03-03 RX ORDER — ONDANSETRON 4 MG/1
4 TABLET, ORALLY DISINTEGRATING ORAL EVERY 6 HOURS PRN
Status: DISCONTINUED | OUTPATIENT
Start: 2023-03-03 | End: 2023-03-05 | Stop reason: HOSPADM

## 2023-03-03 RX ORDER — ONDANSETRON 2 MG/ML
INJECTION INTRAMUSCULAR; INTRAVENOUS PRN
Status: DISCONTINUED | OUTPATIENT
Start: 2023-03-03 | End: 2023-03-03

## 2023-03-03 RX ORDER — FENTANYL CITRATE 50 UG/ML
INJECTION, SOLUTION INTRAMUSCULAR; INTRAVENOUS PRN
Status: DISCONTINUED | OUTPATIENT
Start: 2023-03-03 | End: 2023-03-03

## 2023-03-03 RX ORDER — ONDANSETRON 4 MG/1
4 TABLET, ORALLY DISINTEGRATING ORAL EVERY 30 MIN PRN
Status: CANCELLED | OUTPATIENT
Start: 2023-03-03

## 2023-03-03 RX ORDER — AMOXICILLIN 250 MG
1 CAPSULE ORAL 2 TIMES DAILY
Status: DISCONTINUED | OUTPATIENT
Start: 2023-03-03 | End: 2023-03-05 | Stop reason: HOSPADM

## 2023-03-03 RX ORDER — SODIUM CHLORIDE, SODIUM LACTATE, POTASSIUM CHLORIDE, CALCIUM CHLORIDE 600; 310; 30; 20 MG/100ML; MG/100ML; MG/100ML; MG/100ML
INJECTION, SOLUTION INTRAVENOUS CONTINUOUS
Status: DISCONTINUED | OUTPATIENT
Start: 2023-03-03 | End: 2023-03-03 | Stop reason: HOSPADM

## 2023-03-03 RX ORDER — HYDRALAZINE HYDROCHLORIDE 20 MG/ML
20 INJECTION INTRAMUSCULAR; INTRAVENOUS ONCE
Status: COMPLETED | OUTPATIENT
Start: 2023-03-03 | End: 2023-03-03

## 2023-03-03 RX ORDER — NALOXONE HYDROCHLORIDE 0.4 MG/ML
0.4 INJECTION, SOLUTION INTRAMUSCULAR; INTRAVENOUS; SUBCUTANEOUS
Status: DISCONTINUED | OUTPATIENT
Start: 2023-03-03 | End: 2023-03-05 | Stop reason: HOSPADM

## 2023-03-03 RX ORDER — ONDANSETRON 2 MG/ML
4 INJECTION INTRAMUSCULAR; INTRAVENOUS EVERY 30 MIN PRN
Status: CANCELLED | OUTPATIENT
Start: 2023-03-03

## 2023-03-03 RX ORDER — HEPARIN SODIUM 5000 [USP'U]/.5ML
5000 INJECTION, SOLUTION INTRAVENOUS; SUBCUTANEOUS EVERY 8 HOURS
Status: DISCONTINUED | OUTPATIENT
Start: 2023-03-04 | End: 2023-03-03 | Stop reason: HOSPADM

## 2023-03-03 RX ORDER — ONDANSETRON 4 MG/1
4 TABLET, ORALLY DISINTEGRATING ORAL EVERY 30 MIN PRN
Status: DISCONTINUED | OUTPATIENT
Start: 2023-03-03 | End: 2023-03-03 | Stop reason: HOSPADM

## 2023-03-03 RX ORDER — ONDANSETRON 2 MG/ML
4 INJECTION INTRAMUSCULAR; INTRAVENOUS EVERY 30 MIN PRN
Status: DISCONTINUED | OUTPATIENT
Start: 2023-03-03 | End: 2023-03-03 | Stop reason: HOSPADM

## 2023-03-03 RX ORDER — HYDROMORPHONE HYDROCHLORIDE 1 MG/ML
0.4 INJECTION, SOLUTION INTRAMUSCULAR; INTRAVENOUS; SUBCUTANEOUS EVERY 5 MIN PRN
Status: CANCELLED | OUTPATIENT
Start: 2023-03-03

## 2023-03-03 RX ORDER — PROCHLORPERAZINE MALEATE 10 MG
10 TABLET ORAL EVERY 6 HOURS PRN
Status: DISCONTINUED | OUTPATIENT
Start: 2023-03-03 | End: 2023-03-03

## 2023-03-03 RX ORDER — HYDROMORPHONE HYDROCHLORIDE 1 MG/ML
0.2 INJECTION, SOLUTION INTRAMUSCULAR; INTRAVENOUS; SUBCUTANEOUS EVERY 5 MIN PRN
Status: CANCELLED | OUTPATIENT
Start: 2023-03-03

## 2023-03-03 RX ORDER — OXYCODONE HYDROCHLORIDE 5 MG/1
10 TABLET ORAL
Status: DISCONTINUED | OUTPATIENT
Start: 2023-03-03 | End: 2023-03-03 | Stop reason: HOSPADM

## 2023-03-03 RX ORDER — LIDOCAINE 40 MG/G
CREAM TOPICAL
Status: DISCONTINUED | OUTPATIENT
Start: 2023-03-03 | End: 2023-03-05 | Stop reason: HOSPADM

## 2023-03-03 RX ORDER — HYDROMORPHONE HCL IN WATER/PF 6 MG/30 ML
0.2 PATIENT CONTROLLED ANALGESIA SYRINGE INTRAVENOUS EVERY 4 HOURS PRN
Status: DISCONTINUED | OUTPATIENT
Start: 2023-03-03 | End: 2023-03-05 | Stop reason: HOSPADM

## 2023-03-03 RX ORDER — HYDROMORPHONE HCL IN WATER/PF 6 MG/30 ML
0.2 PATIENT CONTROLLED ANALGESIA SYRINGE INTRAVENOUS
Status: DISCONTINUED | OUTPATIENT
Start: 2023-03-03 | End: 2023-03-03

## 2023-03-03 RX ORDER — ONDANSETRON 2 MG/ML
4 INJECTION INTRAMUSCULAR; INTRAVENOUS EVERY 6 HOURS PRN
Status: DISCONTINUED | OUTPATIENT
Start: 2023-03-03 | End: 2023-03-03 | Stop reason: HOSPADM

## 2023-03-03 RX ORDER — AMLODIPINE BESYLATE 10 MG/1
10 TABLET ORAL DAILY
Status: DISCONTINUED | OUTPATIENT
Start: 2023-03-03 | End: 2023-03-03

## 2023-03-03 RX ORDER — ONDANSETRON 4 MG/1
4 TABLET, ORALLY DISINTEGRATING ORAL EVERY 6 HOURS PRN
Status: CANCELLED | OUTPATIENT
Start: 2023-03-03

## 2023-03-03 RX ORDER — LIDOCAINE HYDROCHLORIDE 10 MG/ML
INJECTION, SOLUTION INFILTRATION; PERINEURAL PRN
Status: DISCONTINUED | OUTPATIENT
Start: 2023-03-03 | End: 2023-03-03

## 2023-03-03 RX ORDER — FENTANYL CITRATE 50 UG/ML
25 INJECTION, SOLUTION INTRAMUSCULAR; INTRAVENOUS EVERY 5 MIN PRN
Status: CANCELLED | OUTPATIENT
Start: 2023-03-03

## 2023-03-03 RX ORDER — HYDROMORPHONE HCL IN WATER/PF 6 MG/30 ML
0.4 PATIENT CONTROLLED ANALGESIA SYRINGE INTRAVENOUS
Status: DISCONTINUED | OUTPATIENT
Start: 2023-03-03 | End: 2023-03-03

## 2023-03-03 RX ORDER — TRAMADOL HYDROCHLORIDE 50 MG/1
50 TABLET ORAL EVERY 6 HOURS PRN
Status: DISCONTINUED | OUTPATIENT
Start: 2023-03-03 | End: 2023-03-03

## 2023-03-03 RX ORDER — AMLODIPINE BESYLATE 10 MG/1
10 TABLET ORAL DAILY
Status: DISCONTINUED | OUTPATIENT
Start: 2023-03-04 | End: 2023-03-05 | Stop reason: HOSPADM

## 2023-03-03 RX ORDER — METOPROLOL TARTRATE 1 MG/ML
5 INJECTION, SOLUTION INTRAVENOUS ONCE
Status: COMPLETED | OUTPATIENT
Start: 2023-03-03 | End: 2023-03-03

## 2023-03-03 RX ORDER — FENTANYL CITRATE 50 UG/ML
50 INJECTION, SOLUTION INTRAMUSCULAR; INTRAVENOUS EVERY 5 MIN PRN
Status: CANCELLED | OUTPATIENT
Start: 2023-03-03

## 2023-03-03 RX ORDER — AMLODIPINE BESYLATE 10 MG/1
10 TABLET ORAL DAILY
Status: CANCELLED | OUTPATIENT
Start: 2023-03-03

## 2023-03-03 RX ORDER — AMLODIPINE BESYLATE 10 MG/1
10 TABLET ORAL DAILY
Status: DISCONTINUED | OUTPATIENT
Start: 2023-03-03 | End: 2023-03-03 | Stop reason: HOSPADM

## 2023-03-03 RX ORDER — FENTANYL CITRATE 50 UG/ML
50 INJECTION, SOLUTION INTRAMUSCULAR; INTRAVENOUS EVERY 5 MIN PRN
Status: DISCONTINUED | OUTPATIENT
Start: 2023-03-03 | End: 2023-03-03 | Stop reason: HOSPADM

## 2023-03-03 RX ORDER — PIPERACILLIN SODIUM, TAZOBACTAM SODIUM 3; .375 G/15ML; G/15ML
3.38 INJECTION, POWDER, LYOPHILIZED, FOR SOLUTION INTRAVENOUS EVERY 8 HOURS
Status: DISCONTINUED | OUTPATIENT
Start: 2023-03-03 | End: 2023-03-05 | Stop reason: HOSPADM

## 2023-03-03 RX ORDER — FLUMAZENIL 0.1 MG/ML
0.2 INJECTION, SOLUTION INTRAVENOUS
Status: CANCELLED | OUTPATIENT
Start: 2023-03-03 | End: 2023-03-04

## 2023-03-03 RX ORDER — ONDANSETRON 2 MG/ML
4 INJECTION INTRAMUSCULAR; INTRAVENOUS EVERY 6 HOURS PRN
Status: DISCONTINUED | OUTPATIENT
Start: 2023-03-03 | End: 2023-03-05 | Stop reason: HOSPADM

## 2023-03-03 RX ORDER — ONDANSETRON 2 MG/ML
4 INJECTION INTRAMUSCULAR; INTRAVENOUS EVERY 6 HOURS PRN
Status: CANCELLED | OUTPATIENT
Start: 2023-03-03

## 2023-03-03 RX ORDER — BISACODYL 10 MG
10 SUPPOSITORY, RECTAL RECTAL DAILY PRN
Status: DISCONTINUED | OUTPATIENT
Start: 2023-03-03 | End: 2023-03-05 | Stop reason: HOSPADM

## 2023-03-03 RX ADMIN — SUGAMMADEX 265 MG: 100 INJECTION, SOLUTION INTRAVENOUS at 15:08

## 2023-03-03 RX ADMIN — DEXAMETHASONE SODIUM PHOSPHATE 4 MG: 10 INJECTION, SOLUTION INTRAMUSCULAR; INTRAVENOUS at 14:41

## 2023-03-03 RX ADMIN — METOPROLOL TARTRATE 5 MG: 1 INJECTION, SOLUTION INTRAVENOUS at 11:48

## 2023-03-03 RX ADMIN — ONDANSETRON 4 MG: 2 INJECTION INTRAMUSCULAR; INTRAVENOUS at 09:32

## 2023-03-03 RX ADMIN — PROPOFOL 200 MG: 10 INJECTION, EMULSION INTRAVENOUS at 14:34

## 2023-03-03 RX ADMIN — PIPERACILLIN AND TAZOBACTAM 3.38 G: 3; .375 INJECTION, POWDER, FOR SOLUTION INTRAVENOUS at 20:05

## 2023-03-03 RX ADMIN — SODIUM CHLORIDE, POTASSIUM CHLORIDE, SODIUM LACTATE AND CALCIUM CHLORIDE: 600; 310; 30; 20 INJECTION, SOLUTION INTRAVENOUS at 14:21

## 2023-03-03 RX ADMIN — LIDOCAINE HYDROCHLORIDE 3 ML: 10 INJECTION, SOLUTION INFILTRATION; PERINEURAL at 14:34

## 2023-03-03 RX ADMIN — POLYETHYLENE GLYCOL 3350 17 G: 17 POWDER, FOR SOLUTION ORAL at 20:04

## 2023-03-03 RX ADMIN — ACETAMINOPHEN 975 MG: 325 SOLUTION ORAL at 06:49

## 2023-03-03 RX ADMIN — ONDANSETRON 4 MG: 2 INJECTION INTRAMUSCULAR; INTRAVENOUS at 19:40

## 2023-03-03 RX ADMIN — ROCURONIUM BROMIDE 50 MG: 50 INJECTION, SOLUTION INTRAVENOUS at 14:34

## 2023-03-03 RX ADMIN — AMLODIPINE BESYLATE 10 MG: 10 TABLET ORAL at 07:57

## 2023-03-03 RX ADMIN — MIDAZOLAM 2 MG: 1 INJECTION INTRAMUSCULAR; INTRAVENOUS at 14:27

## 2023-03-03 RX ADMIN — PIPERACILLIN AND TAZOBACTAM 3.38 G: 3; .375 INJECTION, POWDER, FOR SOLUTION INTRAVENOUS at 06:42

## 2023-03-03 RX ADMIN — ACETAMINOPHEN 975 MG: 325 SOLUTION ORAL at 01:07

## 2023-03-03 RX ADMIN — ONDANSETRON 4 MG: 2 INJECTION INTRAMUSCULAR; INTRAVENOUS at 17:08

## 2023-03-03 RX ADMIN — FENTANYL CITRATE 100 MCG: 50 INJECTION, SOLUTION INTRAMUSCULAR; INTRAVENOUS at 14:34

## 2023-03-03 RX ADMIN — ONDANSETRON 4 MG: 2 INJECTION INTRAMUSCULAR; INTRAVENOUS at 15:04

## 2023-03-03 RX ADMIN — SODIUM CHLORIDE, POTASSIUM CHLORIDE, SODIUM LACTATE AND CALCIUM CHLORIDE: 600; 310; 30; 20 INJECTION, SOLUTION INTRAVENOUS at 14:51

## 2023-03-03 RX ADMIN — HYDRALAZINE HYDROCHLORIDE 20 MG: 20 INJECTION INTRAMUSCULAR; INTRAVENOUS at 16:19

## 2023-03-03 RX ADMIN — METOPROLOL TARTRATE 5 MG: 1 INJECTION, SOLUTION INTRAVENOUS at 15:39

## 2023-03-03 RX ADMIN — METOPROLOL TARTRATE 5 MG: 5 INJECTION INTRAVENOUS at 16:04

## 2023-03-03 ASSESSMENT — ACTIVITIES OF DAILY LIVING (ADL)
ADLS_ACUITY_SCORE: 22
ADLS_ACUITY_SCORE: 35
ADLS_ACUITY_SCORE: 22
ADLS_ACUITY_SCORE: 35
ADLS_ACUITY_SCORE: 22
ADLS_ACUITY_SCORE: 35
ADLS_ACUITY_SCORE: 22

## 2023-03-03 NOTE — PROGRESS NOTES
Patient transferred to [facility] for ERCP via [facility] stretcher. Plan for patient to return this evening after procedure. Patient took cellphone with her, all other belongings (dufflebag, pillow, blanket) remain in the room.  Alejandra Montesinos RN  3/3/2023

## 2023-03-03 NOTE — PLAN OF CARE
Problem: Pain Acute  Goal: Optimal Pain Control and Function  Outcome: Progressing   Goal Outcome Evaluation:         Pt is A&Ox4. Tramadol given x1 for abdominal pain. Tolerating clears, will be NPO at midnight for ERCP tomorrow. Voiding, ambulating. No flatus yet, +bowel sounds. PIV SL x2 with intermittent antibiotics. Lap sites intact x4, OSMANI drain with bloody/red output. Plan to transfer to [facility] tomorrow for ERCP and will return around 1700.

## 2023-03-03 NOTE — ANESTHESIA CARE TRANSFER NOTE
Patient: Sam Morales    Procedure: Procedure(s):  ENDOSCOPIC RETROGRADE CHOLANGIOPANCREATOGRAPHY, BILIARY SPHINCTEROTOMY, STONE EXTRACTION       Diagnosis: Cholangitis [K83.09]  Diagnosis Additional Information: No value filed.    Anesthesia Type:   General     Note:    Oropharynx: oropharynx clear of all foreign objects  Level of Consciousness: drowsy  Oxygen Supplementation: face mask  Level of Supplemental Oxygen (L/min / FiO2): 6  Independent Airway: airway patency satisfactory and stable  Dentition: dentition unchanged  Vital Signs Stable: post-procedure vital signs reviewed and stable  Report to RN Given: handoff report given  Patient transferred to: PACU  Comments: Patient transferred to PACU by CRNA. Report given to PACU RN, all questions answered. Patient hemodynamically stable. CRNA ensured PACU RN was comfortable taking over care.  Handoff Report: Identifed the Patient, Identified the Reponsible Provider, Reviewed the pertinent medical history, Discussed the surgical course, Reviewed Intra-OP anesthesia mangement and issues during anesthesia, Set expectations for post-procedure period and Allowed opportunity for questions and acknowledgement of understanding      Vitals:  Vitals Value Taken Time   /130 03/03/23 1516   Temp 99.3    Pulse 106 03/03/23 1515   Resp 20 03/03/23 1515   SpO2 99 % 03/03/23 1515   Vitals shown include unvalidated device data.    Electronically Signed By: DAMIEN Loja CRNA  March 3, 2023  3:17 PM

## 2023-03-03 NOTE — ANESTHESIA PREPROCEDURE EVALUATION
Anesthesia Pre-Procedure Evaluation    Patient: Sam Morales   MRN: [id number] : 1985        Procedure : Procedure(s):  ENDOSCOPIC RETROGRADE CHOLANGIOPANCREATOGRAPHY          Past Medical History:   Diagnosis Date     Hypertension      Motion sickness      Obese       Past Surgical History:   Procedure Laterality Date     CHOLANGIOGRAM N/A 3/2/2023    Procedure: with cholangiograms;  Surgeon: Mayco Joseph DO;  Location: [facility] Main OR     LAPAROSCOPIC CHOLECYSTECTOMY N/A 3/2/2023    Procedure: CHOLECYSTECTOMY, LAPAROSCOPIC;  Surgeon: Mayco Joseph DO;  Location: [facility] Main OR      No Known Allergies   Social History     Tobacco Use     Smoking status: Never     Smokeless tobacco: Never   Substance Use Topics     Alcohol use: Yes     Comment: ocassionally maybe 1x month      Wt Readings from Last 1 Encounters:   23 131.5 kg (290 lb)        Anesthesia Evaluation   Pt has had prior anesthetic.     History of anesthetic complications       ROS/MED HX  ENT/Pulmonary:       Neurologic:       Cardiovascular:       METS/Exercise Tolerance:     Hematologic:       Musculoskeletal:       GI/Hepatic:       Renal/Genitourinary:       Endo:     (+) Obesity,     Psychiatric/Substance Use:       Infectious Disease:       Malignancy:       Other:            Physical Exam    Airway        Mallampati: II    Neck ROM: full     Respiratory Devices and Support         Dental       (+) Minor Abnormalities - some fillings, tiny chips      Cardiovascular   cardiovascular exam normal          Pulmonary   pulmonary exam normal                OUTSIDE LABS:  CBC:   Lab Results   Component Value Date    WBC 11.1 (H) 2023    WBC 8.8 2023    HGB 10.7 (L) 2023    HGB 10.7 (L) 2023    HCT 34.4 (L) 2023    HCT 35.2 2023     2023     2023     BMP:   Lab Results   Component Value Date     2023     2023    POTASSIUM 3.5  03/03/2023    POTASSIUM 3.8 03/02/2023    CHLORIDE 107 03/03/2023    CHLORIDE 108 (H) 03/02/2023    CO2 21 (L) 03/03/2023    CO2 25 03/02/2023    BUN 7 (L) 03/03/2023    BUN 6 (L) 03/02/2023    CR 1.00 03/03/2023    CR 1.00 03/02/2023     (H) 03/03/2023     (H) 03/03/2023     COAGS: No results found for: PTT, INR, FIBR  POC:   Lab Results   Component Value Date    HCGS Negative 03/01/2023     HEPATIC:   Lab Results   Component Value Date    ALBUMIN 3.0 (L) 03/03/2023    PROTTOTAL 6.0 03/03/2023     (H) 03/03/2023     (H) 03/03/2023    ALKPHOS 288 (H) 03/03/2023    BILITOTAL 3.9 (H) 03/03/2023     OTHER:   Lab Results   Component Value Date    RANDY 9.0 03/03/2023    LIPASE 24 03/01/2023       Anesthesia Plan    ASA Status:  3      Anesthesia Type: General.     - Airway: ETT              Consents    Anesthesia Plan(s) and associated risks, benefits, and realistic alternatives discussed. Questions answered and patient/representative(s) expressed understanding.     - Discussed: Risks, Benefits and Alternatives for the PROCEDURE were discussed     - Discussed with:  Patient      - Extended Intubation/Ventilatory Support Discussed: No.      - Patient is DNR/DNI Status: No    Use of blood products discussed: No .     Postoperative Care    Pain management: Multi-modal analgesia.   PONV prophylaxis: Ondansetron (or other 5HT-3), Dexamethasone or Solumedrol     Comments:                Namita Paredes MD

## 2023-03-03 NOTE — ANESTHESIA POSTPROCEDURE EVALUATION
Patient: Sam Morales    Procedure: Procedure(s):  ENDOSCOPIC RETROGRADE CHOLANGIOPANCREATOGRAPHY, BILIARY SPHINCTEROTOMY, STONE EXTRACTION       Anesthesia Type:  General    Note:  Disposition: Outpatient   Postop Pain Control: Uneventful            Sign Out: Well controlled pain   PONV: No   Neuro/Psych: Uneventful            Sign Out: Acceptable/Baseline neuro status   Airway/Respiratory: Uneventful            Sign Out: Acceptable/Baseline resp. status   CV/Hemodynamics: Uneventful            Sign Out: Acceptable CV status; No obvious hypovolemia; No obvious fluid overload   Other NRE: NONE   DID A NON-ROUTINE EVENT OCCUR? No           Last vitals:  Vitals Value Taken Time   /108 03/03/23 1610   Temp 37.4  C (99.3  F) 03/03/23 1515   Pulse 83 03/03/23 1611   Resp 24 03/03/23 1611   SpO2 94 % 03/03/23 1611   Vitals shown include unvalidated device data.    Electronically Signed By: Namita Paredes MD  March 3, 2023  4:13 PM

## 2023-03-03 NOTE — PROGRESS NOTES
"[facility]    Medicine Progress Note - Hospitalist Service    Date of Admission:  3/1/2023    Assessment & Plan   Sam Morales is a 37 year old female admitted with acute cholecystitis with intraop cholangiogram concerning for choledocholithiasis.  Afebrile currently.  Awaiting ERCP.  Continue abx.    # Acute cholecystitis  # Suspected choledocolithiasis  - pip/tazo  - NPO  - transfer/readmit today for ERCP  - CBC, CMP     # Hypertensive urgency  - increase amlodipine to 10mg daily       Diet: NPO per Anesthesia Guidelines for Procedure/Surgery Except for: Meds    DVT Prophylaxis: heparin subcutaneous to start post procedure  Crooks Catheter: Not present  Lines: None     Cardiac Monitoring: None  Code Status: Full Code      Clinically Significant Risk Factors           # Hypercalcemia: corrected calcium is >10.1, will monitor as appropriate    # Hypoalbuminemia: Lowest albumin = 3.1 g/dL at 3/2/2023  6:44 AM, will monitor as appropriate           # Severe Obesity: Estimated body mass index is 44.08 kg/m  as calculated from the following:    Height as of this encounter: 1.727 m (5' 8\").    Weight as of this encounter: 131.5 kg (289 lb 14.5 oz)., PRESENT ON ADMISSION         Disposition Plan      Expected Discharge Date: 03/04/2023      Destination: home with family  Discharge Comments: ERCP at [facility] 3/3 then back          Trevor Ascencio MD  Hospitalist Service  [facility]  Securely message with [employer] (more info)  Text page via [email or web address] Paging/Directory   ______________________________________________________________________    Interval History   Denies chest pain/pressure, SOB, nausea or vomiting.  Endorses abdominal pain.  Is passing gas.    Physical Exam   Vital Signs: Temp: 98.8  F (37.1  C) Temp src: Oral BP: (!) 183/96 Pulse: 95   Resp: 18 SpO2: 95 % O2 Device: None (Room air) Oxygen Delivery: 2 LPM  Weight: 289 lbs 14.48 oz    Gen:  Lying comfortably in " bed, nad  CV: nl rate, regular rhythm  Pulm:  No acute resp distress, ctab anteriorly  GI:  Soft, mild right sided TTP, non distended, right sided drain in place    Medical Decision Making       15 MINUTES SPENT BY ME on the date of service doing chart review, history, exam, documentation & further activities per the note.      Data

## 2023-03-03 NOTE — OR NURSING
Pt. Updated on delay of surgery start time to 14:30.  Pt. Offered bathroom and call light in reach.  Pt. Wife in room with pt.

## 2023-03-03 NOTE — H&P
GENERAL PRE-PROCEDURE:   Procedure:  ERCP - Choledocholithiasis   Date/Time:  3/3/2023 8:57 AM    Verbal consent obtained?: Yes    Written consent obtained?: Yes    Risks and benefits: Risks, benefits and alternatives were discussed    Consent given by:  Patient  Patient states understanding of procedure being performed: Yes    Patient's understanding of procedure matches consent: Yes    Procedure consent matches procedure scheduled: Yes    Expected level of sedation:  Deep  Appropriately NPO:  Yes  ASA Class:  3  Mallampati  :  Grade 2- soft palate, base of uvula, tonsillar pillars, and portion of posterior pharyngeal wall visible  Lungs:  Lungs clear with good breath sounds bilaterally  Heart:  Normal heart sounds and rate  History & Physical reviewed:  History and physical reviewed and no updates needed  Statement of review:  I have reviewed the lab findings, diagnostic data, medications, and the plan for sedation

## 2023-03-03 NOTE — PLAN OF CARE
Pt is A&Ox4. Pt rated pain 2-3/10 while in bed and 6/10 with movement during shift thus far. Managed with scheduled tylenol and ice. Full sensation per pt. SBA for ambulating. IV abx infusing. Voiding adequately. NPO since midnight. Education on medication administration, alarms, and use of call-light to reduce risk for falls and injury. No further issues noted. CMS intact. VSS other than elevated bp, did not meet parameters to contact provider. No shortness of breath or nausea. Stretcher ride at 8AM scheduled for transfer to [facility] for ERCP.

## 2023-03-03 NOTE — ANESTHESIA PROCEDURE NOTES
Airway       Patient location during procedure: OR       Procedure Start/Stop Times: 3/3/2023 2:36 PM and 3/3/2023 2:39 PM  Staff -        Anesthesiologist:  Namita Paredes MD       CRNA: Kang Braxton APRN CRNA       Performed By: CRNA  Consent for Airway        Urgency: elective  Indications and Patient Condition       Indications for airway management: kayley-procedural       Induction type:intravenous       Mask difficulty assessment: 1 - vent by mask    Final Airway Details       Final airway type: endotracheal airway       Successful airway: ETT - single  Endotracheal Airway Details        ETT size (mm): 7.0       Cuffed: yes       Successful intubation technique: direct laryngoscopy       DL Blade Type: Cat 2       Grade View of Cords: 1       Adjucts: stylet       Position: Right       Measured from: lips       Secured at (cm): 21       Bite block used: None    Post intubation assessment        Placement verified by: capnometry, equal breath sounds and chest rise        Number of attempts at approach: 1       Number of other approaches attempted: 0       Secured with: silk tape       Ease of procedure: easy       Dentition: Intact and Unchanged    Medication(s) Administered   Medication Administration Time: 3/3/2023 2:36 PM

## 2023-03-04 LAB
ALBUMIN SERPL-MCNC: 2.9 G/DL (ref 3.5–5)
ALP SERPL-CCNC: 264 U/L (ref 45–120)
ALT SERPL W P-5'-P-CCNC: 551 U/L (ref 0–45)
ANION GAP SERPL CALCULATED.3IONS-SCNC: 9 MMOL/L (ref 5–18)
AST SERPL W P-5'-P-CCNC: 183 U/L (ref 0–40)
BILIRUB SERPL-MCNC: 3.5 MG/DL (ref 0–1)
BUN SERPL-MCNC: 11 MG/DL (ref 8–22)
CALCIUM SERPL-MCNC: 8.9 MG/DL (ref 8.5–10.5)
CHLORIDE BLD-SCNC: 106 MMOL/L (ref 98–107)
CO2 SERPL-SCNC: 22 MMOL/L (ref 22–31)
CREAT SERPL-MCNC: 1.1 MG/DL (ref 0.6–1.1)
ERYTHROCYTE [DISTWIDTH] IN BLOOD BY AUTOMATED COUNT: 17 % (ref 10–15)
GFR SERPL CREATININE-BSD FRML MDRD: 65 ML/MIN/1.73M2
GLUCOSE BLD-MCNC: 115 MG/DL (ref 70–125)
GLUCOSE BLDC GLUCOMTR-MCNC: 117 MG/DL (ref 70–99)
HAV IGM SERPL QL IA: NONREACTIVE
HBV CORE IGM SERPL QL IA: NONREACTIVE
HBV SURFACE AG SERPL QL IA: NONREACTIVE
HCT VFR BLD AUTO: 35.2 % (ref 35–47)
HCV AB SERPL QL IA: NONREACTIVE
HGB BLD-MCNC: 10.8 G/DL (ref 11.7–15.7)
MCH RBC QN AUTO: 24.8 PG (ref 26.5–33)
MCHC RBC AUTO-ENTMCNC: 30.7 G/DL (ref 31.5–36.5)
MCV RBC AUTO: 81 FL (ref 78–100)
PLATELET # BLD AUTO: 278 10E3/UL (ref 150–450)
POTASSIUM BLD-SCNC: 3.3 MMOL/L (ref 3.5–5)
PROT SERPL-MCNC: 5.9 G/DL (ref 6–8)
RBC # BLD AUTO: 4.35 10E6/UL (ref 3.8–5.2)
SODIUM SERPL-SCNC: 137 MMOL/L (ref 136–145)
WBC # BLD AUTO: 10.8 10E3/UL (ref 4–11)

## 2023-03-04 PROCEDURE — 80053 COMPREHEN METABOLIC PANEL: CPT | Performed by: HOSPITALIST

## 2023-03-04 PROCEDURE — 99232 SBSQ HOSP IP/OBS MODERATE 35: CPT | Performed by: HOSPITALIST

## 2023-03-04 PROCEDURE — 250N000013 HC RX MED GY IP 250 OP 250 PS 637: Performed by: HOSPITALIST

## 2023-03-04 PROCEDURE — 120N000001 HC R&B MED SURG/OB

## 2023-03-04 PROCEDURE — 85027 COMPLETE CBC AUTOMATED: CPT | Performed by: HOSPITALIST

## 2023-03-04 PROCEDURE — 36415 COLL VENOUS BLD VENIPUNCTURE: CPT | Performed by: HOSPITALIST

## 2023-03-04 PROCEDURE — 250N000011 HC RX IP 250 OP 636: Performed by: HOSPITALIST

## 2023-03-04 RX ADMIN — PIPERACILLIN AND TAZOBACTAM 3.38 G: 3; .375 INJECTION, POWDER, FOR SOLUTION INTRAVENOUS at 03:24

## 2023-03-04 RX ADMIN — PIPERACILLIN AND TAZOBACTAM 3.38 G: 3; .375 INJECTION, POWDER, FOR SOLUTION INTRAVENOUS at 18:00

## 2023-03-04 RX ADMIN — AMLODIPINE BESYLATE 10 MG: 10 TABLET ORAL at 08:29

## 2023-03-04 RX ADMIN — PIPERACILLIN AND TAZOBACTAM 3.38 G: 3; .375 INJECTION, POWDER, FOR SOLUTION INTRAVENOUS at 10:48

## 2023-03-04 RX ADMIN — SENNOSIDES AND DOCUSATE SODIUM 1 TABLET: 50; 8.6 TABLET ORAL at 08:29

## 2023-03-04 RX ADMIN — POLYETHYLENE GLYCOL 3350 17 G: 17 POWDER, FOR SOLUTION ORAL at 08:29

## 2023-03-04 ASSESSMENT — ACTIVITIES OF DAILY LIVING (ADL)
ADLS_ACUITY_SCORE: 35
ADLS_ACUITY_SCORE: 18
ADLS_ACUITY_SCORE: 35
ADLS_ACUITY_SCORE: 18

## 2023-03-04 NOTE — PROGRESS NOTES
General Surgery Brief Note    Patient had fallen off our list because of her transfer back and forth between [facility] and [facility] for ERCP.  Because of this, we missed seeing her when we were over at [facility] this morning. From surgery standpoint, she can advance her diet but would defer to GI. Drain in place. Can be removed before discharge if not bilious.     Trevor Galvez MD  General Surgeon  [facility]  Surgery [facility] - [address] [address]?  Office: [phone]

## 2023-03-04 NOTE — PLAN OF CARE
Problem: Pain Acute  Goal: Optimal Pain Control and Function  Outcome: Progressing  Intervention: Develop Pain Management Plan  Recent Flowsheet Documentation  Taken 3/3/2023 2333 by Amber Cat RN  Pain Management Interventions: medication offered but refused  Intervention: Prevent or Manage Pain  Recent Flowsheet Documentation  Taken 3/3/2023 2333 by Amber Cat, RN  Medication Review/Management: medications reviewed   Goal Outcome Evaluation:       Patient complains of very minimal pain, declining any medication intervention. Slept well on shift. IV Zosyn given on shift, saline locked in between. Is up with standby assistance. Patient does state that sometimes she gets nauseous when she is getting up out of bed but that it was improved overnight. Alert and oriented and calls appropriately.

## 2023-03-04 NOTE — PROGRESS NOTES
Brief staff note    Followed up with patient after she returned from ERCP.  She was in no extremis.  Blood pressure improved to 150s.    Clear liquid diet per GI recs, continue antibiotics.

## 2023-03-04 NOTE — PLAN OF CARE
Problem: Plan of Care - These are the overarching goals to be used throughout the patient stay.    Goal: Readiness for Transition of Care  Outcome: Progressing   Goal Outcome Evaluation:       Reports adequate pain control (1/10). C/o nausea, had a small emesis, about 150 ml, received zofran, reported good relief. Tolerating clear liquids, hypoactive bowel sounds, passing gas and voiding. Afebrile, receiving iv antibiotics. OSMANI drain output was about 40 ml this evening. Ambulating in room independently.

## 2023-03-04 NOTE — PROGRESS NOTES
Hospitalist Progress Note        CODE STATUS:  Full Code    Assessment/Plan:  Sam Morales is a 37 year old female  was admitted with acute cholecystitis with intraop cholangiogram concerning for choledocholithiasis. She is s/p lap choley on Mar 2 here at [facility].  She is s/p ERCP on Mar 3 at [facility] which confirmed choledocholithiasis.      Acute cholecystitis  Choledocholithiasis  - General Surgery & GI following  - s/p lap Choley on Mar 2  - s/p ERCP on Mar 3 -- s/p biliary sphincterotomy, balloon extraction with complete removal of stones. Pus seen in biliary treee.   - Continue Zosyn  - Per GI; no AC for 72 hours & can continue CLD for 24 hours post procedure  - Consider ADAT later today  - Labs today pending     Hypertensive urgency  - Not clear that she had a prior diagnosis of HTN prior to admission  - Started on Amlodipine this admission which was increased to 10mg daily      DVT Prophylaxis:    Disposition;/Barrier to discharge; Not medically ready      Subjective:  Interval History:   Patient seen and examined.  No events overnight.  She is tolerating clear liquid diet.      Review of Systems:   As mentioned in subjective.    Patient Active Problem List   Diagnosis     Acute cholecystitis     Elevated LFTs       Scheduled Meds:    amLODIPine  10 mg Oral Daily     piperacillin-tazobactam  3.375 g Intravenous Q8H     polyethylene glycol  17 g Oral Daily     senna-docusate  1 tablet Oral BID     sodium chloride (PF)  3 mL Intracatheter Q8H     sodium chloride (PF)  3 mL Intracatheter Q8H     Continuous Infusions:  PRN Meds:.bisacodyl, HYDROmorphone **OR** HYDROmorphone, lidocaine 4%, lidocaine (buffered or not buffered), magnesium hydroxide, melatonin, naloxone **OR** naloxone **OR** naloxone **OR** naloxone, ondansetron **OR** ondansetron, sodium chloride (PF)    Objective:  Vital signs in last 24 hours:  Temp:  [98.2  F (36.8  C)-99.3  F (37.4  C)] 99  F (37.2  C)  Pulse:   [] 101  Resp:  [16-28] 20  BP: (155-205)/() 155/86  SpO2:  [94 %-100 %] 96 %      Physical Exam:  General: Obese. In NAD  HEENT: NCAT & EOMI  CV: Normal S1S2, regular rhythm, normal rate  Lungs: CTAB  Abdomen: Soft, NT, ND, +BS +OSMANI drain with serosanguinous fluid. Surgical sites appear intact.  Extremities: No LE edema b/l      Lab Results:    Recent Results (from the past 24 hour(s))   Comprehensive metabolic panel    Collection Time: 03/03/23  7:59 AM   Result Value Ref Range    Sodium 140 136 - 145 mmol/L    Potassium 3.5 3.5 - 5.0 mmol/L    Chloride 107 98 - 107 mmol/L    Carbon Dioxide (CO2) 21 (L) 22 - 31 mmol/L    Anion Gap 12 5 - 18 mmol/L    Urea Nitrogen 7 (L) 8 - 22 mg/dL    Creatinine 1.00 0.60 - 1.10 mg/dL    Calcium 9.0 8.5 - 10.5 mg/dL    Glucose 139 (H) 70 - 125 mg/dL    Alkaline Phosphatase 288 (H) 45 - 120 U/L     (H) 0 - 40 U/L     (H) 0 - 45 U/L    Protein Total 6.0 6.0 - 8.0 g/dL    Albumin 3.0 (L) 3.5 - 5.0 g/dL    Bilirubin Total 3.9 (H) 0.0 - 1.0 mg/dL    GFR Estimate 73 >60 mL/min/1.73m2   CBC with platelets    Collection Time: 03/03/23  7:59 AM   Result Value Ref Range    WBC Count 11.1 (H) 4.0 - 11.0 10e3/uL    RBC Count 4.38 3.80 - 5.20 10e6/uL    Hemoglobin 10.7 (L) 11.7 - 15.7 g/dL    Hematocrit 34.4 (L) 35.0 - 47.0 %    MCV 79 78 - 100 fL    MCH 24.4 (L) 26.5 - 33.0 pg    MCHC 31.1 (L) 31.5 - 36.5 g/dL    RDW 16.2 (H) 10.0 - 15.0 %    Platelet Count 260 150 - 450 10e3/uL   ENDOSCOPIC RETROGRADE CHOLANGIOPANCREATOGRAPHY    Collection Time: 03/03/23  2:15 PM   Result Value Ref Range    ERCP       [facility]  [address] [place] [address]  _______________________________________________________________________________  Patient Name: Sam Morales         Procedure Date: 3/3/2023 2:15 PM  MRN: [id number]                       Account Number: [id number]  YOB: 1985              Admit Type: Outpatient  Age: 37                                Room: [place] [address]  Note Status: Finalized                Attending MD: PATRICIA SANCHEZ MD  Instrument Name: ERCP Scope [id number]        _______________________________________________________________________________     Procedure:             ERCP  Indications:           Common bile duct stone(s)  Providers:             PATRICIA SANCHEZ MD  Patient Profile:       This is a 37 year old female.  Referring MD:            Medicines:             General Anesthesia  Complications:         No immediate complications.  _______________________________________________________________________________  Procedure:              Pre-Anesthesia Assessment:                         - Prior to the procedure, a History and Physical was                          performed, and patient medications, allergies and                          sensitivities were reviewed. The patient's tolerance                          of previous anesthesia was reviewed.                         After obtaining informed consent, the scope was passed                          under direct vision. Throughout the procedure, the                          patient's blood pressure, pulse, and oxygen                          saturations were monitored continuously. The ERCP                          scope was introduced through the mouth, and used to                          inject contrast into and used for direct visualization                          of the bile duct. The ERCP was accomplished without                          difficulty. The patient tolerated the procedure well.                                                                                    Findings:       The  film was normal. The esophagus was successfully intubated        under direct vision. The scope was advanced to a normal major papilla in        the descending duodenum without detailed examination of the pharynx,        larynx and associated structures, and upper GI  tract. The upper GI tract        was grossly normal. The bile duct was deeply cannulated. Contrast was        injected. The main bile duct contained filling defect(s) thought to be a        stone. A wire was passed into the biliary tree. An 8 mm biliary        sphincterotomy was made with a traction (standard) sphincterotome using        ERBE electrocautery. There was no post-sphincterotomy bleeding. The        biliary tree was swept with an 11.5 mm balloon starting at the        bifurcation. Sludge was swept from the duct. All stones were removed.        Pus was swept from the duct.                                                                                   Moderate S edation:       GA  Impression:            - A filling defect consistent with a stone was seen on                          the cholangiogram.                         - Choledocholithiasis was found. Complete removal was                          accomplished by biliary sphincterotomy and balloon                          extraction.                         - A biliary sphincterotomy was performed.                         - The biliary tree was swept and pus was found.  Recommendation:        - No anticoagulation for 72 hours.                         - Clear liquid diet for 24 hours.                         - If pain free after 24 hours advance diet slowly as                          tolerated.                         - Transfer patient to another hospital.                                                                                     Patricia Looney MD  ____________________  PATRICIA LOONEY MD  3/3/2023 3:22:49 PM  I was physically present for the entire viewing portion of the exam.   __________________________  Signature of teaching physician  [id number] LOONEY MD  Number of Addenda: 0    Note Initiated On: 3/3/2023 2:15 PM  Scope In: 2:50:56 PM  Scope Out: 3:02:19 PM         Serum Glucose range:   Recent Labs   Lab 03/03/23  0759  03/03/23  0646 03/02/23  0644 03/01/23  0948   * 114* 138* 125     ABG: No lab results found in last 7 days.  CBC:   Recent Labs   Lab 03/03/23  0759 03/02/23  0644 03/01/23  0948   WBC 11.1* 8.8 8.0   HGB 10.7* 10.7* 11.9   HCT 34.4* 35.2 38.0   MCV 79 81 78    232 315   NEUTROPHIL  --   --  81   LYMPH  --   --  10   MONOCYTE  --   --  6   EOSINOPHIL  --   --  1     Chemistry:   Recent Labs   Lab 03/03/23  0759 03/02/23  0644 03/01/23  0948    141 141   POTASSIUM 3.5 3.8 3.9   CHLORIDE 107 108* 105   CO2 21* 25 23   BUN 7* 6* 9   CR 1.00 1.00 1.04   GFRESTIMATED 73 73 70   RANDY 9.0 8.8 9.6   PROTTOTAL 6.0 6.0 6.8   ALBUMIN 3.0* 3.1* 3.5   * 370* 612*   * 679* 567*   ALKPHOS 288* 277* 266*   BILITOTAL 3.9* 2.7* 1.8*     Coags:  No results for input(s): INR, PROTIME, PTT in the last 168 hours.    Invalid input(s): APTT  Cardiac Markers:  Recent Labs   Lab 03/01/23  0948   TROPONINI <0.01                  03/04/2023   Alejandra Bloom MD  Hospitalist  Pager: [phone]

## 2023-03-04 NOTE — PHARMACY-ADMISSION MEDICATION HISTORY
Pharmacy Note - Admission Medication History    Pertinent Provider Information:    ______________________________________________________________________    Prior To Admission (PTA) med list completed and updated in EMR.       PTA Med List   Medication Sig Last Dose     acetaminophen (TYLENOL) 500 MG tablet Take 500-1,000 mg by mouth every 6 hours as needed for mild pain prn     omeprazole (PRILOSEC) 20 MG DR capsule Take 20 mg by mouth daily as needed prn       Information source(s): Patient    Method of interview communication: in-person    Patient was asked about OTC/herbal products specifically.  PTA med list reflects this.    Based on the pharmacist's assessment, the PTA med list information appears reliable    Medication Affordability:       Allergies were reviewed, assessed, and updated with the patient.      Patient does not use any multi-dose medications prior to admission.     Thank you for the opportunity to participate in the care of this patient.      Adry Rivas RPH     3/4/2023     10:38 AM

## 2023-03-04 NOTE — PROGRESS NOTES
"GI    Received call asking for help[ with diet order.  Per ERCP report 3/3/2023, pt to be on clear liquids x 24 hours after ERCP.  If OK, then slowly advance diet.  I spoke with RN< who reports pt is doing well.  I advanced diet to full liquids.  If pt tolerates full liq well, could then advance to low-fat diet thereafter.      LFT\"s still elevated.  We will plan on seeing tomorrow.   Please contact with concerns or questions.     Thank you.  Kang Solano PA-C   [phone]  [facility]     "

## 2023-03-04 NOTE — PLAN OF CARE
Problem: Pain Acute  Goal: Optimal Pain Control and Function  Outcome: Progressing  Intervention: Develop Pain Management Plan  Recent Flowsheet Documentation  Taken 3/4/2023 1207 by Diana Carter RN  Pain Management Interventions: declines  Intervention: Prevent or Manage Pain  Recent Flowsheet Documentation  Taken 3/4/2023 1207 by Diana Carter RN  Medication Review/Management: medications reviewed    Pt alert and oriented x 4. VSS with exception on elevated bp. MD aware and started pt on scheduled amlodipine. Denies SOB, chest pain and N/V. Has mild pain to surgical site on abdomen. Pain being controlled with rest. OSMANI drain patent with minimal drainage inside, however, has moderate drainage to dressing site. Dressing changed this shift. Diet advanced to low saturated fat. Tolerating well. Plan to discharge home tomorrow pending am labs. Will continue to monitor and intervene as needed.

## 2023-03-05 VITALS
HEART RATE: 94 BPM | SYSTOLIC BLOOD PRESSURE: 151 MMHG | BODY MASS INDEX: 43.98 KG/M2 | WEIGHT: 289.24 LBS | RESPIRATION RATE: 16 BRPM | TEMPERATURE: 98.5 F | OXYGEN SATURATION: 96 % | DIASTOLIC BLOOD PRESSURE: 83 MMHG

## 2023-03-05 PROBLEM — K80.43 CHOLEDOCHOLITHIASIS WITH ACUTE CHOLECYSTITIS WITH OBSTRUCTION: Status: ACTIVE | Noted: 2023-03-05

## 2023-03-05 PROBLEM — R03.0 ELEVATED BLOOD PRESSURE READING WITHOUT DIAGNOSIS OF HYPERTENSION: Status: ACTIVE | Noted: 2023-03-05

## 2023-03-05 PROBLEM — I10 ESSENTIAL HYPERTENSION: Status: ACTIVE | Noted: 2023-03-05

## 2023-03-05 LAB
ALBUMIN SERPL-MCNC: 2.8 G/DL (ref 3.5–5)
ALP SERPL-CCNC: 251 U/L (ref 45–120)
ALT SERPL W P-5'-P-CCNC: 466 U/L (ref 0–45)
ANION GAP SERPL CALCULATED.3IONS-SCNC: 13 MMOL/L (ref 5–18)
AST SERPL W P-5'-P-CCNC: 158 U/L (ref 0–40)
BILIRUB DIRECT SERPL-MCNC: 1 MG/DL
BILIRUB SERPL-MCNC: 1.8 MG/DL (ref 0–1)
BUN SERPL-MCNC: 13 MG/DL (ref 8–22)
CALCIUM SERPL-MCNC: 8.6 MG/DL (ref 8.5–10.5)
CHLORIDE BLD-SCNC: 107 MMOL/L (ref 98–107)
CO2 SERPL-SCNC: 19 MMOL/L (ref 22–31)
CREAT SERPL-MCNC: 1.13 MG/DL (ref 0.6–1.1)
GFR SERPL CREATININE-BSD FRML MDRD: 63 ML/MIN/1.73M2
GLUCOSE BLD-MCNC: 147 MG/DL (ref 70–125)
GLUCOSE BLDC GLUCOMTR-MCNC: 111 MG/DL (ref 70–99)
POTASSIUM BLD-SCNC: 3.2 MMOL/L (ref 3.5–5)
PROT SERPL-MCNC: 5.7 G/DL (ref 6–8)
SODIUM SERPL-SCNC: 139 MMOL/L (ref 136–145)

## 2023-03-05 PROCEDURE — 36415 COLL VENOUS BLD VENIPUNCTURE: CPT | Performed by: PHYSICIAN ASSISTANT

## 2023-03-05 PROCEDURE — 250N000011 HC RX IP 250 OP 636: Performed by: HOSPITALIST

## 2023-03-05 PROCEDURE — 99239 HOSP IP/OBS DSCHRG MGMT >30: CPT | Performed by: HOSPITALIST

## 2023-03-05 PROCEDURE — 84155 ASSAY OF PROTEIN SERUM: CPT | Performed by: HOSPITALIST

## 2023-03-05 PROCEDURE — 250N000013 HC RX MED GY IP 250 OP 250 PS 637: Performed by: HOSPITALIST

## 2023-03-05 PROCEDURE — 82947 ASSAY GLUCOSE BLOOD QUANT: CPT | Performed by: HOSPITALIST

## 2023-03-05 PROCEDURE — 82248 BILIRUBIN DIRECT: CPT | Performed by: PHYSICIAN ASSISTANT

## 2023-03-05 RX ORDER — POTASSIUM CHLORIDE 1500 MG/1
40 TABLET, EXTENDED RELEASE ORAL ONCE
Status: COMPLETED | OUTPATIENT
Start: 2023-03-05 | End: 2023-03-05

## 2023-03-05 RX ORDER — AMLODIPINE BESYLATE 10 MG/1
10 TABLET ORAL DAILY
Qty: 30 TABLET | Refills: 0 | Status: SHIPPED | OUTPATIENT
Start: 2023-03-06 | End: 2023-04-04

## 2023-03-05 RX ADMIN — AMLODIPINE BESYLATE 10 MG: 10 TABLET ORAL at 08:17

## 2023-03-05 RX ADMIN — POLYETHYLENE GLYCOL 3350 17 G: 17 POWDER, FOR SOLUTION ORAL at 08:17

## 2023-03-05 RX ADMIN — PIPERACILLIN AND TAZOBACTAM 3.38 G: 3; .375 INJECTION, POWDER, FOR SOLUTION INTRAVENOUS at 03:24

## 2023-03-05 RX ADMIN — POTASSIUM CHLORIDE 40 MEQ: 1500 TABLET, EXTENDED RELEASE ORAL at 11:14

## 2023-03-05 ASSESSMENT — ACTIVITIES OF DAILY LIVING (ADL)
ADLS_ACUITY_SCORE: 18

## 2023-03-05 NOTE — PROGRESS NOTES
ASSESSMENT:  No diagnosis found.    Sam Morales is a 37 year old female who is s/p lap jackson witih pos IOC on 3/2, POD# 3, ERCP 3/3     PLAN:  -Diet per GI, ok to adat from our standpoint  -OSMANI can be removed  -Ok with discharge from our standpoint, our orders and recs have been placed    Nahid Vargas PA-C  [facility]  Surgery [facility] - [address]  [address]  [place] [address]?  Office: [phone]      SUBJECTIVE:   She is doing great, minimal pain, no n/v, tolerating diet, hopes to go home    Patient Vitals for the past 24 hrs:   BP Temp Temp src Pulse Resp SpO2 Weight   03/05/23 1019 (!) 151/83 -- -- 94 -- -- --   03/05/23 0748 (!) 166/90 98.5  F (36.9  C) Oral 83 16 96 % --   03/04/23 2334 (!) 140/83 98.7  F (37.1  C) Oral 92 16 96 % 131.2 kg (289 lb 3.9 oz)   03/04/23 1531 (!) 145/79 98.7  F (37.1  C) Oral 94 16 96 % --   03/04/23 1207 (!) 159/87 98.4  F (36.9  C) Oral 101 18 97 % --        PHYSICAL EXAM:  GEN: No acute distress, comfortable  CV:RRR  ABD:soft, nondistended, minimal ttp, dressings c/d/i  Drains: serous   EXT:No cyanosis, edema or obvious abnormalities    03/04 0700 - 03/05 0659  In: 1380 [P.O.:1140; I.V.:240]  Out: 108 [Drains:108]    Lab Results   Component Value Date    WBC 10.8 03/04/2023    HGB 10.8 03/04/2023    HCT 35.2 03/04/2023    MCV 81 03/04/2023     03/04/2023     INR/Prothrombin Time  Recent Labs   Lab 03/04/23  0948      CO2 22   BUN 11     Lab Results   Component Value Date     (H) 03/04/2023     (H) 03/04/2023    ALKPHOS 264 (H) 03/04/2023

## 2023-03-05 NOTE — PLAN OF CARE
Problem: Plan of Care - These are the overarching goals to be used throughout the patient stay.    Goal: Readiness for Transition of Care  Outcome: Progressing   Goal Outcome Evaluation:       Patient is only complaining of minimal pain at surgical site but is declining needing any pain medication for it. Is passing gas, voiding well, and had a bowel movement on previous shift. Is up and ambulating independently well. OSMANI drain with minimal output and with some small drainage leaking around the insertion site, dressing changed on shift. Is declining having any nausea on shift.

## 2023-03-05 NOTE — PROGRESS NOTES
"GI Progress Note  Sam Morales  -72     Subjective:   Feels good. Says, \"I feel 100%!\".  Tolerated low- fat diet this morning.   Drain with minimal output -- anticipate removal today.   Discussed a couple more days of antibiotics, given purulence on ERCP.   Awaiting lab draw today.    Objective:   BP (!) 166/90 (BP Location: Left arm)   Pulse 83   Temp 98.5  F (36.9  C) (Oral)   Resp 16   Wt 131.2 kg (289 lb 3.9 oz)   LMP 02/16/2023   SpO2 96%   BMI 43.98 kg/m    Body mass index is 43.98 kg/m .   Gen: No acute distress  Cardio: RRR  GI: Obese. Non-distended, BS positive, soft, non-tender to light palpation. No guarding.    Laboratory  Recent Labs   Lab 03/04/23 0948 03/03/23  0759 03/02/23  0644   WBC 10.8 11.1* 8.8   RBC 4.35 4.38 4.36   HGB 10.8* 10.7* 10.7*   HCT 35.2 34.4* 35.2   MCV 81 79 81   MCH 24.8* 24.4* 24.5*   MCHC 30.7* 31.1* 30.4*   RDW 17.0* 16.2* 15.9*    260 232      Recent Labs   Lab 03/04/23  0948 03/03/23  0759 03/02/23  0644    140 141   CO2 22 21* 25   BUN 11 7* 6*     Recent Labs   Lab 03/04/23  0948 03/03/23  0759 03/02/23  0644   ALKPHOS 264* 288* 277*   * 238* 370*   * 645* 679*     No results found for: INR, TROPONIN, TROPI, TROPONIN, TROPR, TROPN    XR Surgery IRAIDA  Fluoro G/T 5 Min    Result Date: 3/2/2023  This exam was marked as non-reportable because it will not be read by a radiologist or a [place] non-radiologist provider.     XR Surgery IRAIDA Fluoro L/T 5 Min    Result Date: 3/3/2023  This exam was marked as non-reportable because it will not be read by a radiologist or a [place] non-radiologist provider.         Assessment:   Acute cholecystitis with choledocholithiasis.  Status post laparoscopic cholecystectomy with cholangiograms.  Cholangiogram showing a filling defect.  Patient had ERCP 3/3 with sphincterotomy and removal of sludge and purulence.  -Clinically improving.  -Today's LFTs still pending.  LFTs had shown a mixed pattern " "- both obstructive and hepatocellular.  -Has been on Zosyn since 3/2/2023.  -Did not have evidence of pancreatitis.    Patient Active Problem List   Diagnosis     Acute cholecystitis     Elevated LFTs      Plan:   1.  Low-fat diet.  2.  Continue Zosyn while here.  -Favor discharge on 2 days of Cipro and Flagyl, since purulence was found on the ERCP.  3.  Today's LFTs pending.  4.  If remains clinically improved and LFTs are showing improvement, would be okay to discharge from GI perspective.  5.  Recommend checking LFTs next week to assure continued downward trend.  This can be done through primary care provider.  6.  No outpatient follow-up needed with GI.     If continues to tolerate diet advancement and LFTs show improvement, could then discharge later today from GI perspective. -- Will await today's labs.   Thank you for the opportunity to be involved in her care.  Kang Solano PA-C  [facility]  [phone]       ADDENDUM   LFT's back now.  Bili has dropped to 1.8.  Other LFT\"s slowly coming down.  Reviewed with RN.  OK to discharge from GI perspective. Again, recheck LFT next week.  IF LFT\"s continue to decline, then no GI outpatient follow-up necessary.     Kang Solano PA-C  [facility]  "

## 2023-03-05 NOTE — DISCHARGE SUMMARY
[facility] MEDICINE  DISCHARGE SUMMARY     Primary Care Physician: System, Provider Not In  Admission Date: 3/3/2023   Discharge Provider: Alejandra Bloom MD Discharge Date: 3/5/2023   Diet: Low Fat      Activity: DCACTIVITY: Activity as tolerated        Condition at Discharge: Stable     REASON FOR PRESENTATION(See Admission Note for Details)   Abdominal Pain     PRINCIPAL & ACTIVE DISCHARGE DIAGNOSES     Principal Problem:    Essential hypertension  Active Problems:    Acute cholecystitis    Elevated LFTs    Elevated blood pressure reading without diagnosis of hypertension    Choledocholithiasis with acute cholecystitis with obstruction      PENDING LABS     Unresulted Labs Ordered in the Past 30 Days of this Admission     Date and Time Order Name Status Description    3/2/2023 11:37 AM Surgical Pathology Exam In process             PROCEDURES ( this hospitalization only)          RECOMMENDATIONS TO OUTPATIENT PROVIDER FOR F/U VISIT     Follow-up Appointments     Follow-up and recommended labs and tests       With Surgery as needed. A nurse from our office will call you in about 1   week. Please call [phone] with any questions or concerns.         Follow-up and recommended labs and tests       Follow up with primary care provider, within 7 days to evaluate after   surgery, for hospital follow- up, and regarding new diagnosis.  The   following labs/tests are recommended: Complete Metabolic Panel to monitor   liver enzymes. Complete Blood Count and other appropriate workup for   anemia.      Follow up with General Surgery. See other instructions.                 DISPOSITION     Home    SUMMARY OF HOSPITAL COURSE:      Sam Morales is a 37 year old female  was admitted with acute cholecystitis with intraop cholangiogram concerning for choledocholithiasis. She is s/p lap choley on Mar 2 here at [facility].  She is s/p ERCP on Mar 3 at [facility] which  confirmed choledocholithiasis.      Acute cholecystitis  Choledocholithiasis  - General Surgery & GI following  - s/p lap Choley on Mar 2 w/ drain placement. Drain removed today.   - s/p ERCP on Mar 3 -- s/p biliary sphincterotomy, balloon extraction with complete removal of stones. Pus seen in biliary treee.   - Receiving Zosyn. Discharge with Augmentin.   - Tolerating a regular diet.   - Follow with PCP and general surgery    Hypertensive urgency  - Not clear that she had a prior diagnosis of HTN prior to admission  - Started on Amlodipine this admission which was increased to 10mg daily.  - Continue this dose. Follow up w/ PCP for further management    Discharge Medications with Med changes:     Current Discharge Medication List      START taking these medications    Details   amLODIPine (NORVASC) 10 MG tablet Take 1 tablet (10 mg) by mouth daily for 30 days  Qty: 30 tablet, Refills: 0    Associated Diagnoses: Essential hypertension      amoxicillin-clavulanate (AUGMENTIN) 875-125 MG tablet Take 1 tablet by mouth 2 times daily for 5 days  Qty: 11 tablet, Refills: 0    Associated Diagnoses: Choledocholithiasis with acute cholecystitis with obstruction; Elevated LFTs         CONTINUE these medications which have NOT CHANGED    Details   acetaminophen (TYLENOL) 500 MG tablet Take 500-1,000 mg by mouth every 6 hours as needed for mild pain      omeprazole (PRILOSEC) 20 MG DR capsule Take 20 mg by mouth daily as needed             Consults   General Surgery, Gastroenterology      Immunizations given this encounter     Most Recent Immunizations   Administered Date(s) Administered     COVID-19 Vaccine 18+ (Moderna) 10/25/2021     Influenza,INJ,MDCK,PF,Quad >6mo(Flucelvax) 12/03/2022           Anticoagulation Information      Recent INR results: No results for input(s): INR in the last 168 hours.  Warfarin doses (if applicable) or name of other anticoagulant:       SIGNIFICANT IMAGING FINDINGS     No results found for  this visit on 03/03/23.    SIGNIFICANT LABORATORY FINDINGS     Most Recent 3 CBC's:Recent Labs   Lab Test 03/04/23  0948 03/03/23  0759 03/02/23  0644   WBC 10.8 11.1* 8.8   HGB 10.8* 10.7* 10.7*   MCV 81 79 81    260 232     Most Recent 3 BMP's:Recent Labs   Lab Test 03/05/23  1002 03/05/23  0745 03/04/23  0948 03/04/23  0835 03/03/23  0759     --  137  --  140   POTASSIUM 3.2*  --  3.3*  --  3.5   CHLORIDE 107  --  106  --  107   CO2 19*  --  22  --  21*   BUN 13  --  11  --  7*   CR 1.13*  --  1.10  --  1.00   ANIONGAP 13  --  9  --  12   RANDY 8.6  --  8.9  --  9.0   * 111* 115   < > 139*    < > = values in this interval not displayed.         Discharge Orders        Follow-up and recommended labs and tests     With Surgery as needed. A nurse from our office will call you in about 1 week. Please call [phone] with any questions or concerns.     Activity    Your activity upon discharge: activity as tolerated     Wound care and dressings    Instructions to care for your wound at home: Band aids can be removed. White steri strips can come off after 1 week. Cover you drain site until there is no drainage. You may shower tomorrow, no soaking/bathing for 2 weeks.     Reason for your hospital stay    Acute Cholecystitis with Choledocholithiasis     Activity    Your activity upon discharge: activity as tolerated     Follow-up and recommended labs and tests     Follow up with primary care provider, within 7 days to evaluate after surgery, for hospital follow- up, and regarding new diagnosis.  The following labs/tests are recommended: Complete Metabolic Panel to monitor liver enzymes. Complete Blood Count and other appropriate workup for anemia.      Follow up with General Surgery. See other instructions.     Diet    Follow this diet upon discharge: Orders Placed This Encounter      Room Service      Low Saturated Fat Diet       Examination   General: Obese. In NAD  HEENT: NCAT & EOMI  CV: Normal  S1S2, regular rhythm, normal rate  Lungs: CTAB  Abdomen: Soft, NT, ND, +BS +OSMANI drain with serosanguinous fluid. Surgical sites appear intact.  Extremities: No LE edema b/l       Please see EMR for more detailed significant labs, imaging, consultant notes etc.    Alejandra HURTADO MD, personally saw the patient today and spent greater than 30 minutes discharging this patient.    Alejandra Bloom MD  [facility]    CC:System, Provider Not In

## 2023-03-05 NOTE — PROGRESS NOTES
Discharge paperwork reviewed with patient and wife at bedside. Answered all questions and patient to schedule follow up as she is capable. IVs removed and OSMANI drain.

## 2023-03-07 ENCOUNTER — OFFICE VISIT (OUTPATIENT)
Dept: FAMILY MEDICINE | Facility: CLINIC | Age: 38
End: 2023-03-07
Payer: COMMERCIAL

## 2023-03-07 VITALS
OXYGEN SATURATION: 97 % | BODY MASS INDEX: 43.35 KG/M2 | SYSTOLIC BLOOD PRESSURE: 132 MMHG | HEART RATE: 109 BPM | DIASTOLIC BLOOD PRESSURE: 100 MMHG | WEIGHT: 286 LBS | HEIGHT: 68 IN

## 2023-03-07 DIAGNOSIS — R73.09 ELEVATED GLUCOSE: ICD-10-CM

## 2023-03-07 DIAGNOSIS — R79.89 ELEVATED LFTS: Primary | ICD-10-CM

## 2023-03-07 DIAGNOSIS — E66.01 MORBID OBESITY (H): ICD-10-CM

## 2023-03-07 DIAGNOSIS — I10 ESSENTIAL HYPERTENSION: ICD-10-CM

## 2023-03-07 DIAGNOSIS — Z76.89 ENCOUNTER TO ESTABLISH CARE: ICD-10-CM

## 2023-03-07 LAB
ALBUMIN SERPL BCG-MCNC: 3.8 G/DL (ref 3.5–5.2)
ALP SERPL-CCNC: 253 U/L (ref 35–104)
ALT SERPL W P-5'-P-CCNC: 356 U/L (ref 10–35)
ANION GAP SERPL CALCULATED.3IONS-SCNC: 12 MMOL/L (ref 7–15)
AST SERPL W P-5'-P-CCNC: 97 U/L (ref 10–35)
BILIRUB SERPL-MCNC: 0.7 MG/DL
BUN SERPL-MCNC: 9.5 MG/DL (ref 6–20)
CALCIUM SERPL-MCNC: 9.2 MG/DL (ref 8.6–10)
CHLORIDE SERPL-SCNC: 106 MMOL/L (ref 98–107)
CREAT SERPL-MCNC: 0.95 MG/DL (ref 0.51–0.95)
DEPRECATED HCO3 PLAS-SCNC: 22 MMOL/L (ref 22–29)
GFR SERPL CREATININE-BSD FRML MDRD: 78 ML/MIN/1.73M2
GLUCOSE SERPL-MCNC: 94 MG/DL (ref 70–99)
HBA1C MFR BLD: 5.5 % (ref 0–5.6)
PATH REPORT.COMMENTS IMP SPEC: NORMAL
PATH REPORT.FINAL DX SPEC: NORMAL
PATH REPORT.GROSS SPEC: NORMAL
PATH REPORT.MICROSCOPIC SPEC OTHER STN: NORMAL
PATH REPORT.RELEVANT HX SPEC: NORMAL
PHOTO IMAGE: NORMAL
POTASSIUM SERPL-SCNC: 3.9 MMOL/L (ref 3.4–5.3)
PROT SERPL-MCNC: 6.3 G/DL (ref 6.4–8.3)
SODIUM SERPL-SCNC: 140 MMOL/L (ref 136–145)

## 2023-03-07 PROCEDURE — 88342 IMHCHEM/IMCYTCHM 1ST ANTB: CPT | Mod: 26 | Performed by: PATHOLOGY

## 2023-03-07 PROCEDURE — 99495 TRANSJ CARE MGMT MOD F2F 14D: CPT | Performed by: PHYSICIAN ASSISTANT

## 2023-03-07 PROCEDURE — 80053 COMPREHEN METABOLIC PANEL: CPT | Performed by: PHYSICIAN ASSISTANT

## 2023-03-07 PROCEDURE — 88360 TUMOR IMMUNOHISTOCHEM/MANUAL: CPT | Mod: 26 | Performed by: PATHOLOGY

## 2023-03-07 PROCEDURE — 88304 TISSUE EXAM BY PATHOLOGIST: CPT | Mod: 26 | Performed by: PATHOLOGY

## 2023-03-07 PROCEDURE — 83036 HEMOGLOBIN GLYCOSYLATED A1C: CPT | Performed by: PHYSICIAN ASSISTANT

## 2023-03-07 PROCEDURE — 36415 COLL VENOUS BLD VENIPUNCTURE: CPT | Performed by: PHYSICIAN ASSISTANT

## 2023-03-07 PROCEDURE — 88341 IMHCHEM/IMCYTCHM EA ADD ANTB: CPT | Mod: 26 | Performed by: PATHOLOGY

## 2023-03-07 NOTE — PROGRESS NOTES
"  Assessment & Plan   Problem List Items Addressed This Visit        Digestive    Morbid obesity (H)       Circulatory    Essential hypertension     Diagnosed on hospital admission 3/2023.  On amlodipine 10 mg            Other    Elevated LFTs - Primary    Relevant Orders    Comprehensive metabolic panel (BMP + Alb, Alk Phos, ALT, AST, Total. Bili, TP)   Other Visit Diagnoses     Elevated glucose        Relevant Orders    Hemoglobin A1c    Encounter to establish care             Will check labs today, continue current medications.  Follow up 1 month to recheck blood pressure and lft           MED REC REQUIRED  Post Medication Reconciliation Status: discharge medications reconciled, continue medications without change  BMI:   Estimated body mass index is 43.49 kg/m  as calculated from the following:    Height as of this encounter: 1.727 m (5' 8\").    Weight as of this encounter: 129.7 kg (286 lb).   Weight management plan: Discussed healthy diet and exercise guidelines        Return in about 4 weeks (around 4/4/2023) for Follow up blood pressure.    Bronwyn Padilla PA-C  [facility]   Sam is a 37 year old, presenting for the following health issues:  Hospital F/U (3/1/23 to 3/5/23 for abdominal pain and chest pain. Pt states feeling a lot better from how she was last week)      HPI       Hospital Follow-up Visit:    Hospital/Nursing Home/IP Rehab Facility: [facility]  Date of Admission: 3/1/23  Date of Discharge: 3/5/23  Reason(s) for Admission: abdominal pain and chest pain    Was your hospitalization related to COVID-19? No   Problems taking medications regularly:  Can not take big pills  Medication changes since discharge: None  Problems adhering to non-medication therapy:  None    Summary of hospitalization:  [facility] discharge summary reviewed    Sam Morales is a 37 year old female  was admitted with " "acute cholecystitis with intraop cholangiogram concerning for choledocholithiasis. She is s/p lap choley on Mar 2 here at [facility].  She is s/p ERCP on Mar 3 at [facility] which confirmed choledocholithiasis.      Acute cholecystitis  Choledocholithiasis  - General Surgery & GI following  - s/p lap Choley on Mar 2 w/ drain placement. Drain removed today.   - s/p ERCP on Mar 3 -- s/p biliary sphincterotomy, balloon extraction with complete removal of stones. Pus seen in biliary treee.   - Receiving Zosyn. Discharge with Augmentin.   - Tolerating a regular diet.   - Follow with PCP and general surgery     Hypertensive urgency  - Not clear that she had a prior diagnosis of HTN prior to admission  - Started on Amlodipine this admission which was increased to 10mg daily.  - Continue this dose. Follow up w/ PCP for further management    Diagnostic Tests/Treatments reviewed.  Follow up needed: needs follow up on lft, hgb and htn      Other Healthcare Providers Involved in Patient s Care:         Specialist appointment - general surgery  Update since discharge: improved.       Pt reports she is feeling well minimal pain continues to feel tired denies chest pain or shortness of breath she is up and walking and trying to take deep breaths wondering about diet going forward now that she does not have a gallbladder    Plan of care communicated with patient      Review of Systems   Constitutional, HEENT, cardiovascular, pulmonary, gi and gu systems are negative, except as otherwise noted.      Objective    BP (!) 134/100 (BP Location: Left arm, Patient Position: Sitting, Cuff Size: Adult Large)   Pulse 109   Ht 1.727 m (5' 8\")   Wt 129.7 kg (286 lb)   LMP 03/04/2023 (Exact Date)   SpO2 97%   BMI 43.49 kg/m    Body mass index is 43.49 kg/m .  Physical Exam   GENERAL: healthy, alert and no distress  NECK: no adenopathy, no asymmetry, masses, or scars and thyroid normal to palpation  RESP: lungs clear to " auscultation - no rales, rhonchi or wheezes  CV: regular rate and rhythm, normal S1 S2, , no peripheral edema and peripheral pulses strong  ABDOMEN: soft, nontender, no hepatosplenomegaly, no masses and bowel sounds normal.  Incisions healing well without signs of infection  MS: no gross musculoskeletal defects noted, no edema

## 2023-03-07 NOTE — LETTER
March 7, 2023      Sam Morales  [address] [place] [address]        To Whom It May Concern:    Sam Morales was seen in our clinic. She may return to work 3/13/2023 without restrictions.      Sincerely,        Bronwyn Padilla PA-C

## 2023-03-13 ENCOUNTER — TELEPHONE (OUTPATIENT)
Dept: SURGERY | Facility: CLINIC | Age: 38
End: 2023-03-13
Payer: COMMERCIAL

## 2023-03-13 NOTE — TELEPHONE ENCOUNTER
Patient had surgery 3/2 and needs a return to work note including restrictions/lifting.  Please send letter to [employer].    Thanks!

## 2023-04-04 ENCOUNTER — TELEPHONE (OUTPATIENT)
Dept: FAMILY MEDICINE | Facility: CLINIC | Age: 38
End: 2023-04-04
Payer: COMMERCIAL

## 2023-04-04 ENCOUNTER — MYC MEDICAL ADVICE (OUTPATIENT)
Dept: FAMILY MEDICINE | Facility: CLINIC | Age: 38
End: 2023-04-04
Payer: COMMERCIAL

## 2023-04-04 DIAGNOSIS — I10 ESSENTIAL HYPERTENSION: ICD-10-CM

## 2023-04-04 RX ORDER — AMLODIPINE BESYLATE 10 MG/1
10 TABLET ORAL DAILY
Qty: 90 TABLET | Refills: 3 | Status: SHIPPED | OUTPATIENT
Start: 2023-04-04 | End: 2023-04-11

## 2023-04-04 NOTE — TELEPHONE ENCOUNTER
Sending to covering PCP for review.  Please review and advise on patient MyChart message.    Patient has office visit scheduled 4/11/2023.  Prescription written by provider not at [place].    Prescription pended if agreeable for additional medication unit office visit. Routing refill request to provider for review/approval because:  No written by PCP.    Last Written Prescription Date:  3/6/2023  Last Fill Quantity: 30,  # refills: 0   Last office visit provider:  3/7/2023  Future office visit: 4/11/2023    Requested Prescriptions   Pending Prescriptions Disp Refills     amLODIPine (NORVASC) 10 MG tablet 30 tablet 0     Sig: Take 1 tablet (10 mg) by mouth daily       There is no refill protocol information for this order          Primo Sims RN 04/04/23 1:45 PM       Rubina Sims RN  [facility]

## 2023-04-11 ENCOUNTER — OFFICE VISIT (OUTPATIENT)
Dept: FAMILY MEDICINE | Facility: CLINIC | Age: 38
End: 2023-04-11
Payer: COMMERCIAL

## 2023-04-11 VITALS
SYSTOLIC BLOOD PRESSURE: 124 MMHG | BODY MASS INDEX: 44.85 KG/M2 | WEIGHT: 293 LBS | OXYGEN SATURATION: 98 % | HEART RATE: 102 BPM | DIASTOLIC BLOOD PRESSURE: 82 MMHG

## 2023-04-11 DIAGNOSIS — R79.89 ELEVATED LFTS: Primary | ICD-10-CM

## 2023-04-11 DIAGNOSIS — I10 ESSENTIAL HYPERTENSION: ICD-10-CM

## 2023-04-11 LAB
ALBUMIN SERPL BCG-MCNC: 3.9 G/DL (ref 3.5–5.2)
ALP SERPL-CCNC: 127 U/L (ref 35–104)
ALT SERPL W P-5'-P-CCNC: 75 U/L (ref 10–35)
AST SERPL W P-5'-P-CCNC: 47 U/L (ref 10–35)
BILIRUB DIRECT SERPL-MCNC: <0.2 MG/DL (ref 0–0.3)
BILIRUB SERPL-MCNC: 0.2 MG/DL
PROT SERPL-MCNC: 6.3 G/DL (ref 6.4–8.3)

## 2023-04-11 PROCEDURE — 80076 HEPATIC FUNCTION PANEL: CPT | Performed by: PHYSICIAN ASSISTANT

## 2023-04-11 PROCEDURE — 36415 COLL VENOUS BLD VENIPUNCTURE: CPT | Performed by: PHYSICIAN ASSISTANT

## 2023-04-11 PROCEDURE — 99213 OFFICE O/P EST LOW 20 MIN: CPT | Performed by: PHYSICIAN ASSISTANT

## 2023-04-11 RX ORDER — AMLODIPINE BESYLATE 10 MG/1
10 TABLET ORAL DAILY
Qty: 90 TABLET | Refills: 3 | Status: SHIPPED | OUTPATIENT
Start: 2023-04-11 | End: 2024-03-01

## 2023-04-11 NOTE — PROGRESS NOTES
Assessment & Plan     Elevated LFTs  At hospital admission for cholecystitis.  Needs monitoring to ensure return to normal.   - Hepatic panel (Albumin, ALT, AST, Bili, Alk Phos, TP); Future  - Hepatic panel (Albumin, ALT, AST, Bili, Alk Phos, TP)    Essential hypertension  New diagnosis 3/2023.  Stable on current medication regimen, start DASH diet  - amLODIPine (NORVASC) 10 MG tablet; Take 1 tablet (10 mg) by mouth daily             Follow up for cpe, sooner if bp is elevated at home.    Bronwyn Padilla PA-C  [facility]    Rosana Vargas is a 37 year old, presenting for the following health issues:  Follow Up (Wants to check liver panel)        4/11/2023    10:58 AM   Additional Questions   Roomed by Rodger   Accompanied by wife     History of Present Illness       Hypertension: She presents for follow up of hypertension.  She does check blood pressure  regularly outside of the clinic. Outside blood pressures have been over 140/90. She does not follow a low salt diet.     She eats 0-1 servings of fruits and vegetables daily.She consumes 2 sweetened beverage(s) daily.She exercises with enough effort to increase her heart rate 30 to 60 minutes per day.  She exercises with enough effort to increase her heart rate 4 days per week.   She is taking medications regularly.       HTN diagnosed 2/2023, started on amlodipine and is tolerating well. bp at home is running 120-140 systolic.  Denies cp, sob, headaches or dizziness.    Elevated lfts on hospital admission for choledocholithiasis with acute cholecystitis requiring lap jackson. Monitoring labs for return to normal.     Review of Systems   Constitutional, HEENT, cardiovascular, pulmonary, gi and gu systems are negative, except as otherwise noted.      Objective    /82 (BP Location: Left arm, Patient Position: Sitting, Cuff Size: Adult Large)   Pulse 102   Wt 133.8 kg (295 lb)   LMP 03/04/2023 (Exact Date)   SpO2 98%   BMI  44.85 kg/m    Body mass index is 44.85 kg/m .  Physical Exam   GENERAL: healthy, alert and no distress  NECK: no adenopathy, no asymmetry, masses, or scars and thyroid normal to palpation  RESP: lungs clear to auscultation - no rales, rhonchi or wheezes  CV: regular rate and rhythm, normal S1 S2, no S3 or S4, no murmur, click or rub, no peripheral edema and peripheral pulses strong

## 2023-05-11 ENCOUNTER — E-VISIT (OUTPATIENT)
Dept: FAMILY MEDICINE | Facility: CLINIC | Age: 38
End: 2023-05-11

## 2023-05-11 ENCOUNTER — E-VISIT (OUTPATIENT)
Dept: FAMILY MEDICINE | Facility: CLINIC | Age: 38
End: 2023-05-11
Payer: COMMERCIAL

## 2023-05-11 DIAGNOSIS — N89.8 VAGINAL DISCHARGE: ICD-10-CM

## 2023-05-11 DIAGNOSIS — R35.0 URINARY FREQUENCY: Primary | ICD-10-CM

## 2023-05-11 DIAGNOSIS — J06.9 VIRAL URI WITH COUGH: Primary | ICD-10-CM

## 2023-05-11 PROCEDURE — 99421 OL DIG E/M SVC 5-10 MIN: CPT | Performed by: PHYSICIAN ASSISTANT

## 2023-05-12 NOTE — PATIENT INSTRUCTIONS
"  Dear Sam Morales    After reviewing your responses, I've been able to diagnose you with \"Bronchitis\" which is a common infection of your lungs that is nearly always caused by a virus. The virus causes swelling and irritation of the air passages of your lungs which leads to cough. The illness spreads from your nose and throat to your windpipe and airways. It is often called a \"chest cold\" and can last up to 2 weeks, but is not a serious illness. Exposure to cigarette smoke usually makes this significantly worse.      To treat bronchitis, the main thing to do is drink lots of fluids and rest. Cough medications over-the-counter such as mucinex, robitussin or \"cold and sinus\" medications can be helpful. Ibuprofen and Tylenol also help with fevers or aching feelings that you often have with this kind of illness. Do not take ibuprofen if you have kidney disease, stomach ulcers or allergy to aspirin.     Bronchitis is most often highly contagious as viruses are spread through the air or touch. Avoid contact with others who may become infected, particularly children, the elderly and those whose immune systems might be weak.     If your symptoms worsen, you develop chest pain or shortness of breath, fevers over 101, or are not improving in 5 days, please contact your primary care provider for an appointment or visit any of our convenient Walk-in Care or Urgent Care Centers to be seen which can be found on our website here.    Thanks again for choosing us as your health care partner,    Bronwyn Padilla PA-C  "

## 2023-05-12 NOTE — PATIENT INSTRUCTIONS
Boyd Vargas,     I placed an order for urine and a vaginal swab to evaluate your symptoms.  Please schedule a lab only visit.    Bronwyn Padilla PA-C

## 2023-10-02 ENCOUNTER — E-VISIT (OUTPATIENT)
Dept: FAMILY MEDICINE | Facility: CLINIC | Age: 38
End: 2023-10-02
Payer: COMMERCIAL

## 2023-10-02 ENCOUNTER — TELEPHONE (OUTPATIENT)
Dept: FAMILY MEDICINE | Facility: CLINIC | Age: 38
End: 2023-10-02
Payer: COMMERCIAL

## 2023-10-02 DIAGNOSIS — R68.89 FLU-LIKE SYMPTOMS: Primary | ICD-10-CM

## 2023-10-02 PROCEDURE — 99421 OL DIG E/M SVC 5-10 MIN: CPT | Performed by: FAMILY MEDICINE

## 2023-10-02 NOTE — TELEPHONE ENCOUNTER
RN LVM for patient to inquire as to what she was asking for on the e-visit she had submitted.    IVETTE Cardoza  [facility]

## 2023-10-03 NOTE — PATIENT INSTRUCTIONS
Thank you for choosing us for your care. Given your symptoms, I would like you to do a lab-only visit to determine what is causing them.  I have placed the orders.  Please schedule an appointment with the lab right here in [place], or call [phone].  I will let you know when the results are back and next steps to take.

## 2023-10-22 ENCOUNTER — HEALTH MAINTENANCE LETTER (OUTPATIENT)
Age: 38
End: 2023-10-22

## 2024-02-23 SDOH — HEALTH STABILITY: PHYSICAL HEALTH: ON AVERAGE, HOW MANY MINUTES DO YOU ENGAGE IN EXERCISE AT THIS LEVEL?: 30 MIN

## 2024-02-23 SDOH — HEALTH STABILITY: PHYSICAL HEALTH: ON AVERAGE, HOW MANY DAYS PER WEEK DO YOU ENGAGE IN MODERATE TO STRENUOUS EXERCISE (LIKE A BRISK WALK)?: 5 DAYS

## 2024-02-23 ASSESSMENT — SOCIAL DETERMINANTS OF HEALTH (SDOH): HOW OFTEN DO YOU GET TOGETHER WITH FRIENDS OR RELATIVES?: NEVER

## 2024-03-01 ENCOUNTER — OFFICE VISIT (OUTPATIENT)
Dept: FAMILY MEDICINE | Facility: CLINIC | Age: 39
End: 2024-03-01
Payer: COMMERCIAL

## 2024-03-01 VITALS
HEIGHT: 69 IN | SYSTOLIC BLOOD PRESSURE: 106 MMHG | RESPIRATION RATE: 12 BRPM | BODY MASS INDEX: 43.4 KG/M2 | DIASTOLIC BLOOD PRESSURE: 84 MMHG | HEART RATE: 100 BPM | OXYGEN SATURATION: 98 % | WEIGHT: 293 LBS

## 2024-03-01 DIAGNOSIS — R63.5 WEIGHT GAIN: ICD-10-CM

## 2024-03-01 DIAGNOSIS — Z11.4 SCREENING FOR HIV (HUMAN IMMUNODEFICIENCY VIRUS): ICD-10-CM

## 2024-03-01 DIAGNOSIS — I10 ESSENTIAL HYPERTENSION: ICD-10-CM

## 2024-03-01 DIAGNOSIS — E66.813 CLASS 3 SEVERE OBESITY WITH SERIOUS COMORBIDITY AND BODY MASS INDEX (BMI) OF 40.0 TO 44.9 IN ADULT, UNSPECIFIED OBESITY TYPE (H): ICD-10-CM

## 2024-03-01 DIAGNOSIS — Z13.220 LIPID SCREENING: Primary | ICD-10-CM

## 2024-03-01 DIAGNOSIS — E66.01 CLASS 3 SEVERE OBESITY WITH SERIOUS COMORBIDITY AND BODY MASS INDEX (BMI) OF 40.0 TO 44.9 IN ADULT, UNSPECIFIED OBESITY TYPE (H): ICD-10-CM

## 2024-03-01 DIAGNOSIS — Z12.31 VISIT FOR SCREENING MAMMOGRAM: ICD-10-CM

## 2024-03-01 DIAGNOSIS — Z13.1 SCREENING FOR DIABETES MELLITUS: ICD-10-CM

## 2024-03-01 DIAGNOSIS — Z00.00 ROUTINE GENERAL MEDICAL EXAMINATION AT A HEALTH CARE FACILITY: ICD-10-CM

## 2024-03-01 LAB — HBA1C MFR BLD: 5.6 % (ref 0–5.6)

## 2024-03-01 PROCEDURE — 36415 COLL VENOUS BLD VENIPUNCTURE: CPT | Performed by: PHYSICIAN ASSISTANT

## 2024-03-01 PROCEDURE — 80061 LIPID PANEL: CPT | Performed by: PHYSICIAN ASSISTANT

## 2024-03-01 PROCEDURE — 90715 TDAP VACCINE 7 YRS/> IM: CPT | Performed by: PHYSICIAN ASSISTANT

## 2024-03-01 PROCEDURE — 87389 HIV-1 AG W/HIV-1&-2 AB AG IA: CPT | Performed by: PHYSICIAN ASSISTANT

## 2024-03-01 PROCEDURE — 90471 IMMUNIZATION ADMIN: CPT | Performed by: PHYSICIAN ASSISTANT

## 2024-03-01 PROCEDURE — 80053 COMPREHEN METABOLIC PANEL: CPT | Performed by: PHYSICIAN ASSISTANT

## 2024-03-01 PROCEDURE — 84443 ASSAY THYROID STIM HORMONE: CPT | Performed by: PHYSICIAN ASSISTANT

## 2024-03-01 PROCEDURE — 99396 PREV VISIT EST AGE 40-64: CPT | Mod: 25 | Performed by: PHYSICIAN ASSISTANT

## 2024-03-01 PROCEDURE — 83036 HEMOGLOBIN GLYCOSYLATED A1C: CPT | Performed by: PHYSICIAN ASSISTANT

## 2024-03-01 RX ORDER — LISINOPRIL 20 MG/1
20 TABLET ORAL DAILY
Qty: 90 TABLET | Refills: 3 | Status: SHIPPED | OUTPATIENT
Start: 2024-03-01

## 2024-03-01 NOTE — PROGRESS NOTES
"Preventive Care Visit  [facility] DIONTE Padilla PA-C, Family Medicine  Mar 1, 2024    Assessment & Plan     Essential hypertension  - lisinopril (ZESTRIL) 20 MG tablet  Dispense: 90 tablet; Refill: 3  - Nutrition Referral    Lipid screening  - Lipid panel reflex to direct LDL Fasting  - Lipid panel reflex to direct LDL Fasting    Routine general medical examination at a health care facility  - Comprehensive metabolic panel (BMP + Alb, Alk Phos, ALT, AST, Total. Bili, TP)  - Comprehensive metabolic panel (BMP + Alb, Alk Phos, ALT, AST, Total. Bili, TP)    Screening for diabetes mellitus  - Hemoglobin A1c  - Hemoglobin A1c    Screening for HIV (human immunodeficiency virus)  - HIV Antigen Antibody Combo  - HIV Antigen Antibody Combo    Visit for screening mammogram  - MA SCREENING DIGITAL BILAT - Future  (s+30)    Weight gain  - TSH with free T4 reflex  - TSH with free T4 reflex    Class 3 severe obesity with serious comorbidity and body mass index (BMI) of 40.0 to 44.9 in adult, unspecified obesity type (H)  - Semaglutide-Weight Management (WEGOVY) 0.25 MG/0.5ML pen  Dispense: 2 mL; Refill: 0  - Nutrition Referral          BMI  Estimated body mass index is 43.74 kg/m  as calculated from the following:    Height as of this encounter: 1.753 m (5' 9\").    Weight as of this encounter: 134.4 kg (296 lb 3.2 oz).   Weight management plan: Discussed healthy diet and exercise guidelines    Counseling  Appropriate preventive services were discussed with this patient, including applicable screening as appropriate for fall prevention, nutrition, physical activity, Tobacco-use cessation, weight loss and cognition.  Checklist reviewing preventive services available has been given to the patient.  Reviewed patient's diet, addressing concerns and/or questions.   Patient is at risk for social isolation and has been provided with information about the benefit of social connection.   The patient was instructed " to see the dentist every 6 months.   She is at risk for psychosocial distress and has been provided with information to reduce risk.           Rosana Vargas is a 38 year old, presenting for the following:  Physical, Dizziness (Random - throughout the day, w/ nausea.), Blood Draw, and Weight Problem (Wants to Discuss Medications - )        3/1/2024    11:46 AM   Additional Questions   Roomed by Ivania MCKEON CMA   Accompanied by Wife - Amy        Health Care Directive  Patient does not have a Health Care Directive or Living Will: Discussed advance care planning with patient; however, patient declined at this time.        Weight gain, she is exercising daily.  Walking the dog 1 mile daily or twice daily.  Ride a stationary bike.  f Walks a lot at work gets in 7000 steps daily  She has cut down on eating out.          2/23/2024   General Health   How would you rate your overall physical health? (!) FAIR   Feel stress (tense, anxious, or unable to sleep) Only a little   (!) STRESS CONCERN      2/23/2024   Nutrition   Three or more servings of calcium each day? Yes   Diet: Regular (no restrictions)   How many servings of fruit and vegetables per day? (!) 0-1   How many sweetened beverages each day? 0-1         2/23/2024   Exercise   Days per week of moderate/strenous exercise 5 days   Average minutes spent exercising at this level 30 min         2/23/2024   Social Factors   Frequency of gathering with friends or relatives Never   Worry food won't last until get money to buy more No   Food not last or not have enough money for food? No   Do you have housing?  Yes   Are you worried about losing your housing? No   Lack of transportation? No   Unable to get utilities (heat,electricity)? No   (!) SOCIAL CONNECTIONS CONCERN      2/23/2024   Dental   Dentist two times every year? (!) NO         2/23/2024   TB Screening   Were you born outside of [place]?  No         Today's PHQ-2 Score:       2/29/2024    12:52 PM   PHQ-2 ( 1999  [employer])   Q1: Little interest or pleasure in doing things 0   Q2: Feeling down, depressed or hopeless 1   PHQ-2 Score 1   Q1: Little interest or pleasure in doing things Not at all   Q2: Feeling down, depressed or hopeless Several days   PHQ-2 Score 1           2/23/2024   Substance Use   Alcohol more than 3/day or more than 7/wk No   Do you use any other substances recreationally? No     Social History     Tobacco Use    Smoking status: Never     Passive exposure: Never    Smokeless tobacco: Never   Vaping Use    Vaping Use: Never used   Substance Use Topics    Alcohol use: Not Currently     Comment: Not bery often, socially. Its been over 6 months since then    Drug use: Never             2/23/2024   Breast Cancer Screening   Family history of breast, colon, or ovarian cancer? No / Unknown      Mammogram Screening - Mammogram every 1-2 years updated in Health Maintenance based on mutual decision making          2/23/2024   One time HIV Screening   Previous HIV test? Yes         2/23/2024   STI Screening   New sexual partner(s) since last STI/HIV test? No     History of abnormal Pap smear: pt has never had a pap smear.  She is sexually active with her wife only.  Has never had a male partner or used a tampon and is afraid of having anything inserted into her vagina       ASCVD Risk   The ASCVD Risk score (Hima DK, et al., 2019) failed to calculate for the following reasons:    Cannot find a previous HDL lab    Cannot find a previous total cholesterol lab        2/23/2024   Contraception/Family Planning   Questions about contraception or family planning No        Reviewed and updated as needed this visit by Provider                          Review of Systems  CONSTITUTIONAL: NEGATIVE for fever, chills, change in weight  INTEGUMENTARY/SKIN: NEGATIVE for worrisome rashes, moles or lesions  EYES: NEGATIVE for vision changes or irritation  ENT/MOUTH: NEGATIVE for ear, mouth and throat problems  RESP: NEGATIVE  "for significant cough or SOB  BREAST: NEGATIVE for masses, tenderness or discharge  CV: NEGATIVE for chest pain, palpitations or peripheral edema  GI: NEGATIVE for nausea, abdominal pain, heartburn, or change in bowel habits  : NEGATIVE for frequency, dysuria, or hematuria  MUSCULOSKELETAL: NEGATIVE for significant arthralgias or myalgia  NEURO: NEGATIVE for weakness, dizziness or paresthesias  ENDOCRINE: NEGATIVE for temperature intolerance, skin/hair changes  HEME: NEGATIVE for bleeding problems  PSYCHIATRIC: NEGATIVE for changes in mood or affect     Objective    Exam  /84   Pulse 100   Resp 12   Ht 1.753 m (5' 9\")   Wt 134.4 kg (296 lb 3.2 oz)   LMP 02/12/2024 (Within Days)   SpO2 98%   BMI 43.74 kg/m     Estimated body mass index is 43.74 kg/m  as calculated from the following:    Height as of this encounter: 1.753 m (5' 9\").    Weight as of this encounter: 134.4 kg (296 lb 3.2 oz).    Physical Exam  GENERAL: alert and no distress  EYES: Eyes grossly normal to inspection, PERRL and conjunctivae and sclerae normal  HENT: ear canals and TM's normal, nose and mouth without ulcers or lesions  NECK: no adenopathy, no asymmetry, masses, or scars  RESP: lungs clear to auscultation - no rales, rhonchi or wheezes  CV: regular rate and rhythm, normal S1 S2, no S3 or S4, no murmur, click or rub, no peripheral edema  ABDOMEN: soft, nontender, no hepatosplenomegaly, no masses and bowel sounds normal  MS: no gross musculoskeletal defects noted, no edema  SKIN: no suspicious lesions or rashes  NEURO: Normal strength and tone, mentation intact and speech normal  PSYCH: mentation appears normal, affect normal/bright      Signed Electronically by: Bronwyn Padilla PA-C    "

## 2024-03-01 NOTE — PATIENT INSTRUCTIONS
Preventive Care Advice   This is general advice given by our system to help you stay healthy. However, your care team may have specific advice just for you. Please talk to your care team about your preventive care needs.  Nutrition  Eat 5 or more servings of fruits and vegetables each day.  Try wheat bread, brown rice and whole grain pasta (instead of white bread, rice, and pasta).  Get enough calcium and vitamin D. Check the label on foods and aim for 100% of the RDA (recommended daily allowance).  Lifestyle  Exercise at least 150 minutes each week   (30 minutes a day, 5 days a week).  Do muscle strengthening activities 2 days a week. These help control your weight and prevent disease.  No smoking.  Wear sunscreen to prevent skin cancer.  Have a dental exam and cleaning every 6 months.  Yearly exams  See your health care team every year to talk about:  Any changes in your health.  Any medicines your care team has prescribed.  Preventive care, family planning, and ways to prevent chronic diseases.  Shots (vaccines)   HPV shots (up to age 26), if you've never had them before.  Hepatitis B shots (up to age 59), if you've never had them before.  COVID-19 shot: Get this shot when it's due.  Flu shot: Get a flu shot every year.  Tetanus shot: Get a tetanus shot every 10 years.  Pneumococcal, hepatitis A, and RSV shots: Ask your care team if you need these based on your risk.  Shingles shot (for age 50 and up).  General health tests  Diabetes screening:  Starting at age 35, Get screened for diabetes at least every 3 years.  If you are younger than age 35, ask your care team if you should be screened for diabetes.  Cholesterol test: At age 39, start having a cholesterol test every 5 years, or more often if advised.  Bone density scan (DEXA): At age 50, ask your care team if you should have this scan for osteoporosis (brittle bones).  Hepatitis C: Get tested at least once in your life.  STIs (sexually transmitted  infections)  Before age 24: Ask your care team if you should be screened for STIs.  After age 24: Get screened for STIs if you're at risk. You are at risk for STIs (including HIV) if:  You are sexually active with more than one person.  You don't use condoms every time.  You or a partner was diagnosed with a sexually transmitted infection.  If you are at risk for HIV, ask about PrEP medicine to prevent HIV.  Get tested for HIV at least once in your life, whether you are at risk for HIV or not.  Cancer screening tests  Cervical cancer screening: If you have a cervix, begin getting regular cervical cancer screening tests at age 21. Most people who have regular screenings with normal results can stop after age 65. Talk about this with your provider.  Breast cancer scan (mammogram): If you've ever had breasts, begin having regular mammograms starting at age 40. This is a scan to check for breast cancer.  Colon cancer screening: It is important to start screening for colon cancer at age 45.  Have a colonoscopy test every 10 years (or more often if you're at risk) Or, ask your provider about stool tests like a FIT test every year or Cologuard test every 3 years.  To learn more about your testing options, visit: https://www.[employer]/332822.pdf.  For help making a decision, visit: https://bit.ly/ld95542.  Prostate cancer screening test: If you have a prostate and are age 55 to 69, ask your provider if you would benefit from a yearly prostate cancer screening test.  Lung cancer screening: If you are a current or former smoker age 50 to 80, ask your care team if ongoing lung cancer screenings are right for you.  For informational purposes only. Not to replace the advice of your health care provider. Copyright   2023 [employer]. All rights reserved. Clinically reviewed by the [facility] Transitions Program. [employer] 636767 - REV 01/24.    Relationships for Good Health  Relationships are important for  our health and happiness. Social isolation, loneliness and lack of support are bad for your health. Studies show that loneliness can harm health and limit your life span as much as high blood pressure and smoking.   Take some time to reflect on your relationships. Then answer these questions:  Are there people in your life that cause you stress or drain your energy? What can you do to set limits?  ________________________________________________________________________________________________________________________________________________________________________________________________________________________________________________________________________________________________________________________________________________  Who do you enjoy spending time with? Who can you go to for support?  ________________________________________________________________________________________________________________________________________________________________________________________________________________________________________________________________________________________________________________________________________________  What can you do to improve your relationships with others?  __________________________________________________________________________________________________________________________________________________________________________________________________________________  ______________________________________________________________________________________________________________________________  What do you like most about your relationships with others?  ________________________________________________________________________________________________________________________________________________________________________________________________________________________________________________________________________________________________________________________________________________  My  goal: ______________________________________________________________________  I will ______________________________________________________________________________________________________________________________________________________________________________________________    For informational purposes only. Not to replace the advice of your health care provider. Copyright   2018 [facility]. All rights reserved. Clinically reviewed by Bariatric Health  Team. [employer] 650690 - Rev 04/21.    Learning About Stress  What is stress?     Stress is your body's response to a hard situation. Your body can have a physical, emotional, or mental response. Stress is a fact of life for most people, and it affects everyone differently. What causes stress for you may not be stressful for someone else.  A lot of things can cause stress. You may feel stress when you go on a job interview, take a test, or run a race. This kind of short-term stress is normal and even useful. It can help you if you need to work hard or react quickly. For example, stress can help you finish an important job on time.  Long-term stress is caused by ongoing stressful situations or events. Examples of long-term stress include long-term health problems, ongoing problems at work, or conflicts in your family. Long-term stress can harm your health.  How does stress affect your health?  When you are stressed, your body responds as though you are in danger. It makes hormones that speed up your heart, make you breathe faster, and give you a burst of energy. This is called the fight-or-flight stress response. If the stress is over quickly, your body goes back to normal and no harm is done.  But if stress happens too often or lasts too long, it can have bad effects. Long-term stress can make you more likely to get sick, and it can make symptoms of some diseases worse. If you tense up when you are stressed, you may develop neck, shoulder, or low  back pain. Stress is linked to high blood pressure and heart disease.  Stress also harms your emotional health. It can make you mendez, tense, or depressed. Your relationships may suffer, and you may not do well at work or school.  What can you do to manage stress?  You can try these things to help manage stress:   Do something active. Exercise or activity can help reduce stress. Walking is a great way to get started. Even everyday activities such as housecleaning or yard work can help.  Try yoga or ahsley chi. These techniques combine exercise and meditation. You may need some training at first to learn them.  Do something you enjoy. For example, listen to music or go to a movie. Practice your hobby or do volunteer work.  Meditate. This can help you relax, because you are not worrying about what happened before or what may happen in the future.  Do guided imagery. Imagine yourself in any setting that helps you feel calm. You can use online videos, books, or a teacher to guide you.  Do breathing exercises. For example:  From a standing position, bend forward from the waist with your knees slightly bent. Let your arms dangle close to the floor.  Breathe in slowly and deeply as you return to a standing position. Roll up slowly and lift your head last.  Hold your breath for just a few seconds in the standing position.  Breathe out slowly and bend forward from the waist.  Let your feelings out. Talk, laugh, cry, and express anger when you need to. Talking with supportive friends or family, a counselor, or a fady leader about your feelings is a healthy way to relieve stress. Avoid discussing your feelings with people who make you feel worse.  Write. It may help to write about things that are bothering you. This helps you find out how much stress you feel and what is causing it. When you know this, you can find better ways to cope.  What can you do to prevent stress?  You might try some of these things to help prevent  "stress:  Manage your time. This helps you find time to do the things you want and need to do.  Get enough sleep. Your body recovers from the stresses of the day while you are sleeping.  Get support. Your family, friends, and community can make a difference in how you experience stress.  Limit your news feed. Avoid or limit time on social media or news that may make you feel stressed.  Do something active. Exercise or activity can help reduce stress. Walking is a great way to get started.  Where can you learn more?  Go to https://www.[employer].net/patiented  Enter N032 in the search box to learn more about \"Learning About Stress.\"  Current as of: February 26, 2023               Content Version: 13.8    8374-2793 [employer].   Care instructions adapted under license by your healthcare professional. If you have questions about a medical condition or this instruction, always ask your healthcare professional. Healthwise, [employer] disclaims any warranty or liability for your use of this information.      "

## 2024-03-02 LAB
ALBUMIN SERPL BCG-MCNC: 4.2 G/DL (ref 3.5–5.2)
ALP SERPL-CCNC: 114 U/L (ref 40–150)
ALT SERPL W P-5'-P-CCNC: 64 U/L (ref 0–50)
ANION GAP SERPL CALCULATED.3IONS-SCNC: 12 MMOL/L (ref 7–15)
AST SERPL W P-5'-P-CCNC: 48 U/L (ref 0–45)
BILIRUB SERPL-MCNC: 0.3 MG/DL
BUN SERPL-MCNC: 14.1 MG/DL (ref 6–20)
CALCIUM SERPL-MCNC: 9.3 MG/DL (ref 8.6–10)
CHLORIDE SERPL-SCNC: 107 MMOL/L (ref 98–107)
CHOLEST SERPL-MCNC: 184 MG/DL
CREAT SERPL-MCNC: 1.16 MG/DL (ref 0.51–0.95)
DEPRECATED HCO3 PLAS-SCNC: 21 MMOL/L (ref 22–29)
EGFRCR SERPLBLD CKD-EPI 2021: 61 ML/MIN/1.73M2
FASTING STATUS PATIENT QL REPORTED: YES
GLUCOSE SERPL-MCNC: 96 MG/DL (ref 70–99)
HDLC SERPL-MCNC: 46 MG/DL
HIV 1+2 AB+HIV1 P24 AG SERPL QL IA: NONREACTIVE
LDLC SERPL CALC-MCNC: 113 MG/DL
NONHDLC SERPL-MCNC: 138 MG/DL
POTASSIUM SERPL-SCNC: 4.7 MMOL/L (ref 3.4–5.3)
PROT SERPL-MCNC: 6.6 G/DL (ref 6.4–8.3)
SODIUM SERPL-SCNC: 140 MMOL/L (ref 135–145)
TRIGL SERPL-MCNC: 126 MG/DL
TSH SERPL DL<=0.005 MIU/L-ACNC: 1.5 UIU/ML (ref 0.3–4.2)

## 2024-03-09 ENCOUNTER — MYC REFILL (OUTPATIENT)
Dept: FAMILY MEDICINE | Facility: CLINIC | Age: 39
End: 2024-03-09
Payer: COMMERCIAL

## 2024-03-09 DIAGNOSIS — E66.01 CLASS 3 SEVERE OBESITY WITH SERIOUS COMORBIDITY AND BODY MASS INDEX (BMI) OF 40.0 TO 44.9 IN ADULT, UNSPECIFIED OBESITY TYPE (H): ICD-10-CM

## 2024-03-09 DIAGNOSIS — E66.813 CLASS 3 SEVERE OBESITY WITH SERIOUS COMORBIDITY AND BODY MASS INDEX (BMI) OF 40.0 TO 44.9 IN ADULT, UNSPECIFIED OBESITY TYPE (H): ICD-10-CM

## 2024-03-10 ENCOUNTER — HEALTH MAINTENANCE LETTER (OUTPATIENT)
Age: 39
End: 2024-03-10

## 2024-03-15 ENCOUNTER — HOSPITAL ENCOUNTER (OUTPATIENT)
Dept: MAMMOGRAPHY | Facility: CLINIC | Age: 39
Discharge: HOME | End: 2024-03-15
Attending: PHYSICIAN ASSISTANT | Admitting: PHYSICIAN ASSISTANT
Payer: COMMERCIAL

## 2024-03-15 DIAGNOSIS — Z12.31 VISIT FOR SCREENING MAMMOGRAM: ICD-10-CM

## 2024-03-15 PROCEDURE — 77063 BREAST TOMOSYNTHESIS BI: CPT

## 2024-03-22 ENCOUNTER — ANCILLARY PROCEDURE (OUTPATIENT)
Dept: MAMMOGRAPHY | Facility: CLINIC | Age: 39
End: 2024-03-22
Attending: PHYSICIAN ASSISTANT
Payer: COMMERCIAL

## 2024-03-22 DIAGNOSIS — N64.89 BREAST ASYMMETRY: ICD-10-CM

## 2024-03-22 PROCEDURE — 77061 BREAST TOMOSYNTHESIS UNI: CPT | Mod: LT

## 2024-04-05 DIAGNOSIS — I10 ESSENTIAL HYPERTENSION: Primary | ICD-10-CM

## 2024-04-05 RX ORDER — AMLODIPINE BESYLATE 10 MG/1
10 TABLET ORAL DAILY
Qty: 90 TABLET | Refills: 3 | Status: SHIPPED | OUTPATIENT
Start: 2024-04-05 | End: 2024-08-02

## 2024-04-05 RX ORDER — AMLODIPINE BESYLATE 10 MG/1
TABLET ORAL
COMMUNITY
Start: 2024-03-06 | End: 2024-04-05

## 2024-04-17 ENCOUNTER — TELEPHONE (OUTPATIENT)
Dept: CARDIOLOGY | Facility: CLINIC | Age: 39
End: 2024-04-17
Payer: COMMERCIAL

## 2024-04-17 ENCOUNTER — TELEPHONE (OUTPATIENT)
Dept: SURGERY | Facility: CLINIC | Age: 39
End: 2024-04-17
Payer: COMMERCIAL

## 2024-04-17 ENCOUNTER — VIRTUAL VISIT (OUTPATIENT)
Dept: CARDIOLOGY | Facility: CLINIC | Age: 39
End: 2024-04-17
Attending: PHYSICIAN ASSISTANT
Payer: COMMERCIAL

## 2024-04-17 VITALS — HEIGHT: 69 IN | BODY MASS INDEX: 43.4 KG/M2 | WEIGHT: 293 LBS

## 2024-04-17 DIAGNOSIS — E66.01 MORBID OBESITY (H): Primary | ICD-10-CM

## 2024-04-17 ASSESSMENT — PAIN SCALES - GENERAL: PAINLEVEL: NO PAIN (1)

## 2024-04-17 NOTE — PROGRESS NOTES
Medication Therapy Management (MTM) Encounter    ASSESSMENT:                            Medication Adherence/Access: No issues identified    Weight management: Weight unchanged despite ongoing behavioral modifications as well as dietary modifications.  Appropriate candidate for initiation of GLP-1 agonist therapy.  Pretreatment BMI greater than 40 kg/m .  Negative baseline GI symptomatology. Negative history of pancreatitis, medullary thyroid cancer and multiple endocrine neoplasia type 2.  Reviewed mechanism, safety, adverse effects, monitoring, administration with use of Wegovy.    For patients that are under [employer] insurance coverage, it is mandated by insurance that each qualifying patient meet with hospital based Weight Management Medication Therapy Management pharmacist to continue therapy coverage. The following patient meets the below coverage criteria and can therefore continue GLP-1/GIP agonist therapy for Weight Management:    Adult  BMI >40 with or without comorbidities   OR   BMI >30 + NAFLD*   at time of initiating GLP-1/GIP agonist therapy Approved for 29 weeks  Met Updated Initial Criteria   At least 5% weight loss of baseline body weight  Approved for 12 months        PLAN:                            Start Wegovy: It is a subcutaneous injection that you inject once weekly and titrate the dose slowly over time. Make sure inject the same time each week (e.g. every Sunday AM).   Week 1-4: Inject 0.25 mg once weekly  Week 5-8: If tolerating, increase to 0.5 mg once weekly   Week 9-12: If tolerating, increase to 1 mg once weekly   *If you are having some nausea or other side effect(s) to where you are hesitant to move up to the next dose, stay at the same dose you are on for an additional 4 weeks to see if side effects improves/resolves - let the clinic know if that is the case because an additional prescription will need to be sent in for that current dose for refill. Make sure to  "take this time to hydrate and ensure you are drinking at least 64 oz water per day.      Follow-up with me as scheduled in June.    SUBJECTIVE/OBJECTIVE:                          Sam Morales is a 39 year old female contacted via secure video for an initial visit. She was referred to me from [facility] insurance requirements .      Reason for visit: Wegovy consult.    Allergies/ADRs: Reviewed in chart  Past Medical History: Reviewed in chart  Tobacco: She reports that she has never smoked. She has never been exposed to tobacco smoke. She has never used smokeless tobacco.      Medication Adherence/Access: no issues reported    Weight management:  Video consult to discuss initiation of Wegovy.  Patient states she has never used a medication for weight loss historically.  She notes ongoing efforts to attempt to make progress from a weight loss standpoint to assist with medication of chronic disease risk.  Has tried traditional methods, gym, meal planning, dietary modifications. Has been 2-3 years of actively changing habits. Cut out soda, red meat. Eats fish, lean meats.     Fluid/Water intake: \"Could be better\", 2 full glasses daily   Diet: Belvita bar in the morning, water with meds, small coffee, chicken breast, mashed potato, green beans for lunch, cereal, oatmeal. Always tries to have a vegetable, typically a carb  Physical activity: Walks dogs, 2-3 miles at times, gym 3 times per week.     Medical History:  MEN2/Medullary Thyroid Cancer: Negative   Pancreatitis: Negative   Baseline GI symptomatology: Negative. Historically had gallbladder removed, which has improved GI symptoms significantly. Gallbladder out February 2023.      Wt Readings from Last 4 Encounters:   04/17/24 134.3 kg (296 lb)   03/01/24 134.4 kg (296 lb 3.2 oz)   04/11/23 133.8 kg (295 lb)   03/07/23 129.7 kg (286 lb)     Body Mass Index (BMI) Body mass index is 44.35 kg/m .    Today's Vitals: Ht 1.74 m (5' 8.5\")   Wt 134.3 kg (296 lb)   LMP " 02/12/2024 (Within Days)   BMI 44.35 kg/m      Lab Results   Component Value Date    A1C 5.6 03/01/2024    A1C 5.5 03/07/2023       ----------------      I spent 25 minutes with this patient today. All changes were made via collaborative practice agreement with Janeth Fung PA-C . A copy of the visit note was provided to the patient's provider(s).    A summary of these recommendations was sent via [employer].    Chava Sullivan, PharmD, BCACP  Medication Therapy Management Pharmacist  [facility]     Telemedicine Visit Details  Type of service:  Lyndsay  Joined the call at 4/17/2024, 3:05:14 pm.  Left the call at 4/17/2024, 3:31:11 pm.  You were on the call for 25 minutes 57 seconds .     Medication Therapy Recommendations  No medication therapy recommendations to display

## 2024-04-17 NOTE — TELEPHONE ENCOUNTER
Hello team,    Forwarding this encounter as a request to initiate prior authorization for Wegovy 0.25 mg once weekly.  Please let me know if you have any questions or need any additional details.    Thank you for your assistance,  Jack

## 2024-04-17 NOTE — Clinical Note
4/17/2024      RE: Sam Gauthierrogeraureliano  [address] [facility] [address]       Dear Colleague,    Thank you for the opportunity to participate in the care of your patient, Sam Morales, at the [facility] HEART [facility]. Please see a copy of my visit note below.    Medication Therapy Management (MTM) Encounter    ASSESSMENT:                            Medication Adherence/Access: No issues identified    Weight management: Weight unchanged despite ongoing behavioral modifications as well as dietary modifications.  Appropriate candidate for initiation of GLP-1 agonist therapy.  Pretreatment BMI greater than 40 kg/m .  Negative baseline GI symptomatology. Negative history of pancreatitis, medullary thyroid cancer and multiple endocrine neoplasia type 2.  Reviewed mechanism, safety, adverse effects, monitoring, administration with use of Wegovy.    For patients that are under [employer] insurance coverage, it is mandated by insurance that each qualifying patient meet with hospital based Weight Management Medication Therapy Management pharmacist to continue therapy coverage. The following patient meets the below coverage criteria and can therefore continue GLP-1/GIP agonist therapy for Weight Management:    Adult  BMI >40 with or without comorbidities   OR   BMI >30 + NAFLD*   at time of initiating GLP-1/GIP agonist therapy Approved for 29 weeks  Met Updated Initial Criteria   At least 5% weight loss of baseline body weight  Approved for 12 months        PLAN:                            Start Wegovy: It is a subcutaneous injection that you inject once weekly and titrate the dose slowly over time. Make sure inject the same time each week (e.g. every Sunday AM).   Week 1-4: Inject 0.25 mg once weekly  Week 5-8: If tolerating, increase to 0.5 mg once weekly   Week 9-12: If tolerating, increase to 1 mg once weekly   *If you  "are having some nausea or other side effect(s) to where you are hesitant to move up to the next dose, stay at the same dose you are on for an additional 4 weeks to see if side effects improves/resolves - let the clinic know if that is the case because an additional prescription will need to be sent in for that current dose for refill. Make sure to take this time to hydrate and ensure you are drinking at least 64 oz water per day.      Follow-up with me as scheduled in June.    SUBJECTIVE/OBJECTIVE:                          Sam Morales is a 39 year old female contacted via secure video for an initial visit. She was referred to me from [facility] insurance requirements .      Reason for visit: Wegovy consult.    Allergies/ADRs: Reviewed in chart  Past Medical History: Reviewed in chart  Tobacco: She reports that she has never smoked. She has never been exposed to tobacco smoke. She has never used smokeless tobacco.      Medication Adherence/Access: no issues reported    Weight management:  Video consult to discuss initiation of Wegovy.  Patient states she has never used a medication for weight loss historically.  She notes ongoing efforts to attempt to make progress from a weight loss standpoint to assist with medication of chronic disease risk.  Has tried traditional methods, gym, meal planning, dietary modifications. Has been 2-3 years of actively changing habits. Cut out soda, red meat. Eats fish, lean meats.     Fluid/Water intake: \"Could be better\", 2 full glasses daily   Diet: Belvita bar in the morning, water with meds, small coffee, chicken breast, mashed potato, green beans for lunch, cereal, oatmeal. Always tries to have a vegetable, typically a carb  Physical activity: Walks dogs, 2-3 miles at times, gym 3 times per week.     Medical History:  MEN2/Medullary Thyroid Cancer: Negative   Pancreatitis: Negative   Baseline GI symptomatology: Negative. Historically had gallbladder removed, which has improved GI " "symptoms significantly. Gallbladder out February 2023.      Wt Readings from Last 4 Encounters:   04/17/24 134.3 kg (296 lb)   03/01/24 134.4 kg (296 lb 3.2 oz)   04/11/23 133.8 kg (295 lb)   03/07/23 129.7 kg (286 lb)     Body Mass Index (BMI) Body mass index is 44.35 kg/m .    Today's Vitals: Ht 1.74 m (5' 8.5\")   Wt 134.3 kg (296 lb)   LMP 02/12/2024 (Within Days)   BMI 44.35 kg/m      Lab Results   Component Value Date    A1C 5.6 03/01/2024    A1C 5.5 03/07/2023       ----------------      I spent 25 minutes with this patient today. All changes were made via collaborative practice agreement with Janeth Fung PA-C . A copy of the visit note was provided to the patient's provider(s).    A summary of these recommendations was sent via [employer].    Chava Sullivna, PharmD, BCACP  Medication Therapy Management Pharmacist  [facility]     Telemedicine Visit Details  Type of service:  Lyndsay  Joined the call at 4/17/2024, 3:05:14 pm.  Left the call at 4/17/2024, 3:31:11 pm.  You were on the call for 25 minutes 57 seconds .     Medication Therapy Recommendations  No medication therapy recommendations to display         Please do not hesitate to contact me if you have any questions/concerns.     Sincerely,     CHAVA SULLIVAN RPH  "

## 2024-04-17 NOTE — TELEPHONE ENCOUNTER
PA Initiation    Medication: WEGOVY 0.25 MG/0.5ML SC SOAJ  Insurance Company: [employer] - Phone [phone] Fax [phone]  Pharmacy Filling the Rx:    Filling Pharmacy Phone:    Filling Pharmacy Fax:    Start Date: 4/17/2024    Key:[id number]

## 2024-04-17 NOTE — PATIENT INSTRUCTIONS
"Recommendations from today's MTM visit:                                                    MTM (medication therapy management) is a service provided by a clinical pharmacist designed to help you get the most of out of your medicines.      Start Wegovy: It is a subcutaneous injection that you inject once weekly and titrate the dose slowly over time. Make sure inject the same time each week (e.g. every Sunday AM).   Week 1-4: Inject 0.25 mg once weekly  Week 5-8: If tolerating, increase to 0.5 mg once weekly   Week 9-12: If tolerating, increase to 1 mg once weekly   *If you are having some nausea or other side effect(s) to where you are hesitant to move up to the next dose, stay at the same dose you are on for an additional 4 weeks to see if side effects improves/resolves - let the clinic know if that is the case because an additional prescription will need to be sent in for that current dose for refill. Make sure to take this time to hydrate and ensure you are drinking at least 64 oz water per day.       Follow-up with me as scheduled in June.    It was great speaking with you today.  I value your experience and would be very thankful for your time in providing feedback in our clinic survey. In the next few days, you may receive an email or text message from [employer] with a link to a survey related to your  clinical pharmacist.\"     To schedule another MTM appointment, please call the clinic directly or you may call the MTM scheduling line at [phone].    My Clinical Pharmacist's contact information:                                                      Please feel free to contact me with any questions or concerns you have.      Chava Sullivan, PharmD, BCACP  Medication Therapy Management Pharmacist  [facility]    "

## 2024-04-17 NOTE — NURSING NOTE
Is the patient currently in the state of [place]? YES    Visit mode:VIDEO    If the visit is dropped, the patient can be reconnected by: VIDEO VISIT: Text to cell phone:   Telephone Information:   Mobile [phone]       Will anyone else be joining the visit? NO  (If patient encounters technical issues they should call [phone]    How would you like to obtain your AVS? MyChart    Are changes needed to the allergy or medication list? Pt stated no changes to allergies and Pt stated no med changes    Are refills needed on medications prescribed by this physician? NO     Reason for visit: Consult    Wt/ht other than 24 hrs:  3/1  Pain more than one location:  body aches  Kelly DOBBINSF

## 2024-04-19 NOTE — TELEPHONE ENCOUNTER
Prior Authorization Approval    Medication: WEGOVY 0.25 MG/0.5ML SC SOAJ  Authorization Effective Date: 4/18/2024  Authorization Expiration Date: 11/7/2024  Approved Dose/Quantity: 2ml/28 days   Reference #: [id number]   Insurance Company: [employer] - Phone [phone] Fax [phone]  Expected CoPay: $ 75  CoPay Card Available:      Financial Assistance Needed: no  Which Pharmacy is filling the prescription:    Pharmacy Notified: no  Patient Notified: pharmacy will notify patient

## 2024-05-06 ENCOUNTER — MYC MEDICAL ADVICE (OUTPATIENT)
Dept: FAMILY MEDICINE | Facility: CLINIC | Age: 39
End: 2024-05-06
Payer: COMMERCIAL

## 2024-05-09 ENCOUNTER — VIRTUAL VISIT (OUTPATIENT)
Dept: FAMILY MEDICINE | Facility: CLINIC | Age: 39
End: 2024-05-09
Payer: COMMERCIAL

## 2024-05-09 DIAGNOSIS — R11.0 NAUSEA: Primary | ICD-10-CM

## 2024-05-09 PROCEDURE — 99213 OFFICE O/P EST LOW 20 MIN: CPT | Mod: 95 | Performed by: PHYSICIAN ASSISTANT

## 2024-05-09 RX ORDER — ONDANSETRON 4 MG/1
4 TABLET, ORALLY DISINTEGRATING ORAL EVERY 8 HOURS PRN
Qty: 60 TABLET | Refills: 1 | Status: SHIPPED | OUTPATIENT
Start: 2024-05-09

## 2024-05-09 NOTE — PROGRESS NOTES
Sam is a 39 year old who is being evaluated via a billable video visit.    How would you like to obtain your AVS? MyChart  If the video visit is dropped, the invitation should be resent by: Text to cell phone: [phone]  Will anyone else be joining your video visit? No      Assessment & Plan     Nausea  Patient started Wegovy 3 weeks ago starting last week has intermittent nausea throughout the day has had a couple days without nausea she does try to drink 50 to 65 ounces of water daily.  Will start Prilosec Zofran if needed for severe nausea.  I did encourage her to consider staying at her current dose of Wegovy versus going up to the next dose.  She will call her prescriber as well  - omeprazole (PRILOSEC) 20 MG DR capsule  Dispense: 60 capsule; Refill: 3  - ondansetron (ZOFRAN ODT) 4 MG ODT tab  Dispense: 60 tablet; Refill: 1        Subjective   Sam is a 39 year old, presenting for the following health issues:  No chief complaint on file.    History of Present Illness       Reason for visit:  Zofran quesions- adding medication. Experiencing dizziness in mornins and sometimed the evening    She eats 2-3 servings of fruits and vegetables daily.She consumes 0 sweetened beverage(s) daily.She exercises with enough effort to increase her heart rate 30 to 60 minutes per day.  She exercises with enough effort to increase her heart rate 5 days per week.   She is taking medications regularly.     Nausea- started last week, notes she is on wegovy 0.25, she started this 3 weeks ago.  Has nausea intermittently during the day, has had a couple of days that she didn't have the nausea.  Denies vomiting, she is drinking 50 to 65 ounces of water daily she has cut out sugar and has modified her diet.  No she has lost 10 pounds since starting Wegovy 3 weeks ago      Review of Systems  Constitutional, HEENT, cardiovascular, pulmonary, gi and gu systems are negative, except as otherwise noted.      Objective           Vitals:  No  vitals were obtained today due to virtual visit.    Physical Exam   GENERAL: alert and no distress  EYES: Eyes grossly normal to inspection.  No discharge or erythema, or obvious scleral/conjunctival abnormalities.  RESP: No audible wheeze, cough, or visible cyanosis.    SKIN: Visible skin clear. No significant rash, abnormal pigmentation or lesions.  NEURO: Cranial nerves grossly intact.  Mentation and speech appropriate for age.  PSYCH: Appropriate affect, tone, and pace of words          Video-Visit Details    Type of service:  Video Visit   Originating Location (pt. Location): Home    Distant Location (provider location):  On-site  Platform used for Video Visit: Jeffery  Signed Electronically by: Bronwyn Padilla PA-C

## 2024-05-15 ENCOUNTER — HOSPITAL ENCOUNTER (OUTPATIENT)
Dept: NUTRITION | Facility: CLINIC | Age: 39
Discharge: HOME | End: 2024-05-15
Attending: PHYSICIAN ASSISTANT | Admitting: PHYSICIAN ASSISTANT
Payer: COMMERCIAL

## 2024-05-15 PROCEDURE — 97802 MEDICAL NUTRITION INDIV IN: CPT | Mod: GT,95 | Performed by: DIETITIAN, REGISTERED

## 2024-05-15 NOTE — PROGRESS NOTES
Virtual Visit Details    Type of service:  Video Visit     Originating Location (pt. Location): Other work setting     Distant Location (provider location):  On-site  Platform used for Video Visit: [facility]     OUTPATIENT NUTRITION ASSESSMENT   REASON FOR ASSESSMENT  Sam MCLEOD Samrodriguez referred by Bronwyn Padilla PA-C  for MNT related to   Essential hypertension [I10]      Class 3 severe obesity with serious comorbidity and body mass index (BMI) of 40.0 to 44.9 in adult, unspecified obesity type (H) [E66.01, Z68.41]      .    Patient accompanied by self      ASSESSMENT   Nutrition History:  - Information obtained from patient.  - Patient is on a regular diet at home. Patient feels her biggest struggle with food is meal planning. Patient has been focusing on meats, vegetables and no sugar. Patient started Wegovy 3 weeks ago. Patient ws told by pharmacist to avoid red meat and carbohydrates. Patient states typically eats breakfast in the car. Patient has been tracking intake on My Fitness Pal -past 2 days has been eating 1000 calories per day. Patient with reduced appetite from medication.    Likes strawberries, blueberries, watermelon   Yoplait yogurt-strawberry or blueberry; Chobani flips     Diet Recall:  Breakfast: protein shake (premier protein shakes-cafe)  Charleston's + treat (7:00-8:00)   Lunch: salad with ranch (1 cup) croutons, fraser bits 2 [employer] chciken legs, corns, biscuit; Curiel's -fish + small harley or cheeseburger   Dinner: 6 inch cold cut sub with quintero (chicken and salmon); salmon + vegetable; chicken cheddarwurst; cereal   Snack: apple   Beverages: chocolate milk (1/2 cup) 60 oz water   Dining out: fast food-[employer]; [employer] genia's and Curiel's          Exercise: walking dogs mornng and night      NUTRITION FOCUSED PHYSICAL ASSESSMENT (NFPA) FOR DIAGNOSING MALNUTRITION  Yes         Observed:   No nutrition-related physical findings observed    Obtained from Chart/Interdisciplinary  "Team:  None noted     LABS  Labs reviewed    MEDICATIONS  Medications reviewed    ANTHROPOMETRICS   Height: 5'8.5\"   Weight: 296 lbs   BMI (kg/m2): 44.3 kg/m2  Weight Status:  Obesity Grade III BMI >40  %IBW: 206%  Weight History:   Wt Readings from Last 10 Encounters:   04/17/24 134.3 kg (296 lb)   03/01/24 134.4 kg (296 lb 3.2 oz)   04/11/23 133.8 kg (295 lb)   03/07/23 129.7 kg (286 lb)   03/04/23 131.2 kg (289 lb 3.9 oz)   03/03/23 131.5 kg (290 lb)   03/02/23 131.5 kg (289 lb 14.5 oz)        ASSESSED NUTRITION NEEDS  Estimated Energy Needs: 5481-4610 kcals/day (11-14 Kcal/Kg)  Justification:  (obese)  Estimated Protein Needs: 82-98 grams protein/day (1-1.2 g pro/Kg)  Justification:  (maintenance)  Estimated Fluid Needs: 3484-6830 mL/day (30-35 mL/kg)    ASSESSED MALNUTRITION STATUS  % Weight Loss:  None noted  % Intake:  Decreased intake does not meet criteria for malnutrition (intentional reduction)  Subcutaneous Fat Loss:  None observed  Loss of Muscle Mass:  None observed  Fluid Retention:  None noted    Malnutrition Diagnosis:  Patient does not meet two of the above criteria necessary for diagnosing malnutrition    DIAGNOSIS   Nutrition Diagnosis:  Obese, class III related to excess energy intake as evidenced by current BMI 44.3 kg/m2       INTERVENTIONS   Nutrition Prescription   Recommend modified diet for weight loss <150 grams carbohydrate per day       IMPLEMENTATION   Assessed learning needs and learning preference  Teaching Method(s) used: Booklet / Handout  Explanation  Vitamin and Mineral Supplements: recommend complete multivitamin and mineral   Diet Education:  Provided education on weight loss, carbohydrate counting diet  Nutrition Education (Content):   a)  Discussed portion sizes, label reading, carbohydrate counting, added sugars, exercise and behavior modification. Instructed patient on label reading using label from home.  Suggest patient aim for 45 grams carbohydrate per meal and <24 grams " added sugar per day. Discussed My Plate with 1/2 plate vegetables, 1/4 plate protein and 1/4 plate grains. Discussed realistic behavior and diet changes.  Encouraged gradual diet and behavior change for long term weight loss success.  Supported patient with the challenge of diet changes.    b)  My Plate  Nutrition Education (Application):   a)  Discussed meal planning options and Factor meals. Choose low calories factor meals if decides to try    b)  Patient verbalizes understanding of diet by stating will limit carbohydrates    Expected patient engagement: good    GOALS  Focus on vegetables 2   Aim 45 grams carbohydrate per meal     FOLLOW UP/MONITORING   Progress towards goals will be monitored and evaluated per protocol and Practice Guidelines  Patient to follow up in 6 weeks   RD name and number provided     Time Spent with Patient  42 minutes     Dee Chapman, RD, LD  [facility] Outpatient Dietitian  [phone] (office phone)

## 2024-05-15 NOTE — DISCHARGE INSTRUCTIONS
Boyd Vargas,    It was nice meeting you today. Below is the carbohydrate handout that I mentioned:    My Plate    Please let me know if you have any questions prior to our next appointment. Great work so far!    Take care,     Dee Lilly, RD, LD  [facility] Outpatient Dietitian  [phone] (office phone)

## 2024-06-19 ENCOUNTER — TELEPHONE (OUTPATIENT)
Dept: CARDIOLOGY | Facility: CLINIC | Age: 39
End: 2024-06-19
Payer: COMMERCIAL

## 2024-06-19 ENCOUNTER — VIRTUAL VISIT (OUTPATIENT)
Dept: CARDIOLOGY | Facility: CLINIC | Age: 39
End: 2024-06-19
Attending: PHYSICIAN ASSISTANT
Payer: COMMERCIAL

## 2024-06-19 ENCOUNTER — MYC MEDICAL ADVICE (OUTPATIENT)
Dept: CARDIOLOGY | Facility: CLINIC | Age: 39
End: 2024-06-19
Payer: COMMERCIAL

## 2024-06-19 VITALS — BODY MASS INDEX: 41.22 KG/M2 | HEIGHT: 68 IN | WEIGHT: 272 LBS

## 2024-06-19 DIAGNOSIS — E66.01 MORBID OBESITY (H): Primary | ICD-10-CM

## 2024-06-19 DIAGNOSIS — E66.01 MORBID OBESITY (H): ICD-10-CM

## 2024-06-19 ASSESSMENT — PAIN SCALES - GENERAL: PAINLEVEL: NO PAIN (0)

## 2024-06-19 NOTE — TELEPHONE ENCOUNTER
Hello team,    I spoke with Sam (patient) today who has been having some side effects with Wegovy at 0.5 mg once weekly, unfortunately she was just shipped as of 6/18 the 1 mg strength.  Would you be able to t attend to test claim to see if her insurance/[employer] would pay for a fill at the 0.5 mg strength despite the 1 mg having been just shipped out yesterday?    Thank you,  Sam

## 2024-06-19 NOTE — PATIENT INSTRUCTIONS
"Recommendations from today's MTM visit:                                                    MTM (medication therapy management) is a service provided by a clinical pharmacist designed to help you get the most of out of your medicines.      We will attempt to refill the Wegovy at 0.5 mg and have you remain on this weekly for an additional at least 8 weeks.  If your insurance disallows this due to the recent fill of Wegovy 1 mg weekly, we will consider transitioning you to Zepbound instead.     I will message you via [employer] for next steps.    It was great speaking with you today.  I value your experience and would be very thankful for your time in providing feedback in our clinic survey. In the next few days, you may receive an email or text message from [employer] with a link to a survey related to your  clinical pharmacist.\"     To schedule another MTM appointment, please call the clinic directly or you may call the MTM scheduling line at [phone].    My Clinical Pharmacist's contact information:                                                      Please feel free to contact me with any questions or concerns you have.      Chava Sullivan, PharmD, BCACP  Medication Therapy Management Pharmacist  [facility]    "

## 2024-06-19 NOTE — Clinical Note
6/19/2024      RE: Sam Morales  [address]  [address]  [facility] [address]       Dear Colleague,    Thank you for the opportunity to participate in the care of your patient, Sam Morales, at the [facility] HEART [facility]. Please see a copy of my visit note below.    Medication Therapy Management (MTM) Encounter    ASSESSMENT:                            Medication Adherence/Access: No issues identified    Weight management: Profound weight loss and appetite suppression with Wegovy titrated to 0.5 mg weekly.  She has had a handful of instances of vomiting as well as regular nausea.  Some mild constipation as well.  Because of the degree of adverse effects as well as the borderline over restrictive effects from a calorie standpoint, advise against further dose increase to the 1 mg strength at this time.  Unfortunately she was just sent a supply of the 1 mg strength, we will attempt to have her refill 0.5 mg.  If her insurance disallows this, we will consider transition to Zepbound at 2.5 mg weekly.      PLAN:                            We will attempt to refill the Wegovy at 0.5 mg and have you remain on this weekly for an additional at least 8 weeks.  If your insurance disallows this due to the recent fill of Wegovy 1 mg weekly, we will consider transitioning you to Zepbound instead.    I will message you via [employer] for next steps.    SUBJECTIVE/OBJECTIVE:                          Sam Morales is a 39 year old female contacted via secure video for a follow-up visit.       Reason for visit: Wegovy follow-up.    Allergies/ADRs: Reviewed in chart  Past Medical History: Reviewed in chart  Tobacco: She reports that she has never smoked. She has never been exposed to tobacco smoke. She has never used smokeless tobacco.      Medication Adherence/Access: no issues reported    Weight management:  Wegovy 0.5 mg weekly    Due to increase Wegovy  "to 1 mg as of this week, due this upcoming Monday. Things have overall been OK. Reports nausea requiring Zofran, at least daily or every other day. Nausea present consistently on a day to day basis. Still able to get to work. Has had 4 instances of vomiting. Will also have some stomach pain associated with certain foods. Upper stomach, not left or right. Has had some mild constipation, no diarrhea.  Has been increasing water intake compared to previous, getting close to 64 oz daily. Oalmeal/granola cereal, fruits/vegetables with each meal. Has had profound appetite suppression. Getting sometimes less than 1000 calories daily. Has been encouraged by progress, feels overall the adverse effects have been manageable. Feels she has more energy.     Wt Readings from Last 4 Encounters:   06/19/24 123.4 kg (272 lb)   04/17/24 134.3 kg (296 lb)   03/01/24 134.4 kg (296 lb 3.2 oz)   04/11/23 133.8 kg (295 lb)     Body Mass Index (BMI) Body mass index is 41.06 kg/m .    Today's Vitals: Ht 1.734 m (5' 8.25\")   Wt 123.4 kg (272 lb)   BMI 41.06 kg/m      Lab Results   Component Value Date    A1C 5.6 03/01/2024    A1C 5.5 03/07/2023     ----------------      I spent 18 minutes with this patient today. All changes were made via collaborative practice agreement with Janeth Fung PA-C . A copy of the visit note was provided to the patient's provider(s).    A summary of these recommendations was sent via [employer].    Chava Sullivan, PharmD, BCACP  Medication Therapy Management Pharmacist  [facility]     Telemedicine Visit Details  Type of service:  Lyndsay  Joined the call at 6/19/2024, 9:56:38 am.  Left the call at 6/19/2024, 10:15:24 am.  You were on the call for 18 minutes 45 seconds .     Medication Therapy Recommendations  No medication therapy recommendations to display         Please do not hesitate to contact me if you have any questions/concerns.     Sincerely,     CHAVA SULLIVAN [facility]  "

## 2024-06-19 NOTE — NURSING NOTE
Is the patient currently in the state of [place]? YES    Visit mode:VIDEO    If the visit is dropped, the patient can be reconnected by: VIDEO VISIT: Text to cell phone:   Telephone Information:   Mobile [phone]       Will anyone else be joining the visit? NO  (If patient encounters technical issues they should call [phone]    How would you like to obtain your AVS? MyChart    Are changes needed to the allergy or medication list? No    Are refills needed on medications prescribed by this physician? NO    Reason for visit: RECHMICHELLE OWEN

## 2024-06-19 NOTE — PROGRESS NOTES
Medication Therapy Management (MTM) Encounter    ASSESSMENT:                            Medication Adherence/Access: No issues identified    Weight management: Profound weight loss and appetite suppression with Wegovy titrated to 0.5 mg weekly.  She has had a handful of instances of vomiting as well as regular nausea.  Some mild constipation as well.  Because of the degree of adverse effects as well as the borderline over restrictive effects from a calorie standpoint, advise against further dose increase to the 1 mg strength at this time.  Unfortunately she was just sent a supply of the 1 mg strength, we will attempt to have her refill 0.5 mg.  If her insurance disallows this, we will consider transition to Zepbound at 2.5 mg weekly.      PLAN:                            We will attempt to refill the Wegovy at 0.5 mg and have you remain on this weekly for an additional at least 8 weeks.  If your insurance disallows this due to the recent fill of Wegovy 1 mg weekly, we will consider transitioning you to Zepbound instead.    I will message you via [employer] for next steps.    SUBJECTIVE/OBJECTIVE:                          Sam Morales is a 39 year old female contacted via secure video for a follow-up visit.       Reason for visit: Wegovy follow-up.    Allergies/ADRs: Reviewed in chart  Past Medical History: Reviewed in chart  Tobacco: She reports that she has never smoked. She has never been exposed to tobacco smoke. She has never used smokeless tobacco.      Medication Adherence/Access: no issues reported    Weight management:  Wegovy 0.5 mg weekly    Due to increase Wegovy to 1 mg as of this week, due this upcoming Monday. Things have overall been OK. Reports nausea requiring Zofran, at least daily or every other day. Nausea present consistently on a day to day basis. Still able to get to work. Has had 4 instances of vomiting. Will also have some stomach pain associated with certain foods. Upper stomach, not left  "or right. Has had some mild constipation, no diarrhea.  Has been increasing water intake compared to previous, getting close to 64 oz daily. Oalmeal/granola cereal, fruits/vegetables with each meal. Has had profound appetite suppression. Getting sometimes less than 1000 calories daily. Has been encouraged by progress, feels overall the adverse effects have been manageable. Feels she has more energy.     Wt Readings from Last 4 Encounters:   06/19/24 123.4 kg (272 lb)   04/17/24 134.3 kg (296 lb)   03/01/24 134.4 kg (296 lb 3.2 oz)   04/11/23 133.8 kg (295 lb)     Body Mass Index (BMI) Body mass index is 41.06 kg/m .    Today's Vitals: Ht 1.734 m (5' 8.25\")   Wt 123.4 kg (272 lb)   BMI 41.06 kg/m      Lab Results   Component Value Date    A1C 5.6 03/01/2024    A1C 5.5 03/07/2023     ----------------      I spent 18 minutes with this patient today. All changes were made via collaborative practice agreement with Janeth Fung PA-C . A copy of the visit note was provided to the patient's provider(s).    A summary of these recommendations was sent via [employer].    Chava Sullivan, PharmD, BCACP  Medication Therapy Management Pharmacist  [facility]     Telemedicine Visit Details  Type of service:  Lyn[place]  Joined the call at 6/19/2024, 9:56:38 am.  Left the call at 6/19/2024, 10:15:24 am.  You were on the call for 18 minutes 45 seconds .     Medication Therapy Recommendations  No medication therapy recommendations to display     "

## 2024-07-29 ENCOUNTER — MYC MEDICAL ADVICE (OUTPATIENT)
Dept: CARDIOLOGY | Facility: CLINIC | Age: 39
End: 2024-07-29
Payer: COMMERCIAL

## 2024-08-02 ENCOUNTER — VIRTUAL VISIT (OUTPATIENT)
Dept: CARDIOLOGY | Facility: CLINIC | Age: 39
End: 2024-08-02
Attending: PHYSICIAN ASSISTANT
Payer: COMMERCIAL

## 2024-08-02 DIAGNOSIS — E66.01 MORBID OBESITY (H): Primary | ICD-10-CM

## 2024-08-02 NOTE — PROGRESS NOTES
Medication Therapy Management (MTM) Encounter    ASSESSMENT:                            Medication Adherence/Access: No issues identified    Weight management: Steady weight loss progress with reinitiation of Wegovy at 0.25 mg weekly.  Patient has had no similar GI adverse effects in comparison to initial titration efforts roughly 1 to 2 months ago.  At this point, therapy is very well-tolerated with adequate satiety generation effects.  Because of her progress and well-tolerated response in addition to her historical adverse effects with titration, encouraged her to complete a second month at least of the 0.25 mg strength, then consider dose titration from there.  Encourage a conservative approach with dose increases going forward.    Hypertension: Stable.  Advised increased frequency of home monitoring especially with weight loss.      PLAN:                            Try to check your blood pressure at least once weekly.     Remain on Wegovy 0.25 mg once weekly for at least an additional 4 weeks.  If you continue to lose weight and continue to be free of side effects, remain on 0.25 mg once weekly.    Otherwise, then increase to 0.5 mg once weekly thereafter. Can consider use of the 1 mg strength if needed after at least 4 weeks at 0.5 mg provided you are not having side effects.    Follow-up with Charlotte Ash.D. as scheduled in November.    SUBJECTIVE/OBJECTIVE:                          Sam Morales is a 39 year old female called for a follow-up visit.       Reason for visit: Wegovy restart.    Allergies/ADRs: Reviewed in chart  Past Medical History: Reviewed in chart  Tobacco: She reports that she has never smoked. She has never been exposed to tobacco smoke. She has never used smokeless tobacco.      Medication Adherence/Access: no issues reported    Weight management:  Wegovy 0.25 mg weekly    Restarted at 0.25 mg, is due for 4th shot Dewey. Has had no side effects to date. Has had no nausea, vomiting.  Denies constipation, some softer bowel movements but no diarrhea, daily bowel movements. No abdominal pain or acid reflux.   Has the 1 mg strength on hand from previous titration effort. Has made substantial dietary changes prior to this instance of trying Wegovy. Has lots of fruits and veggies, smoothie morning/banana, midday meal reduced portions, veggies and salmon for dinner.  Overall monitoring calories, carbs, avoiding added sugar, following dietitian guidance. Estimates around 8538-6292 calories daily. Weight loss progress has continued, but slowed to an extent. Still working out 6 days/week, 30 minutes/day. Walking dog, gym x 20 minutes. Never tried Omeprazole due to being a capsule, cannot swallow capsules. Has not required ondansetron since restarting Wegovy, previously required daily until discontinuation initially.  She feels she is able to meet her 1000 to 1200-calorie target without troubles from a appetite or restrictive respected.    Wt Readings from Last 4 Encounters:   06/19/24 123.4 kg (272 lb)   04/17/24 134.3 kg (296 lb)   03/01/24 134.4 kg (296 lb 3.2 oz)   04/11/23 133.8 kg (295 lb)     Body Mass Index (BMI) There is no height or weight on file to calculate BMI.    Today's Vitals: There were no vitals taken for this visit.    Lab Results   Component Value Date    A1C 5.6 03/01/2024    A1C 5.5 03/07/2023     HTN:   Lisinopril 20 mg every day    Home monitored blood pressures have been stable, hasn't checked recently. Hasn't had any light headedness or dizziness. Stopped amlodipine previously.     BP Readings from Last 3 Encounters:   03/01/24 106/84   04/11/23 124/82   03/07/23 (!) 132/100      ----------------      I spent 15 minutes with this patient today. All changes were made via collaborative practice agreement with Janeth Fung PA-C . A copy of the visit note was provided to the patient's provider(s).    A summary of these recommendations was sent via [employer].    Chava Sullivan  PharmD, BCACP  Medication Therapy Management Pharmacist  [facility]     Telemedicine Visit Details  Type of service:  Telephone visit  Start Time:  1110am  End Time:  1125am     Medication Therapy Recommendations  No medication therapy recommendations to display

## 2024-08-02 NOTE — PATIENT INSTRUCTIONS
"Recommendations from today's MTM visit:                                                    MTM (medication therapy management) is a service provided by a clinical pharmacist designed to help you get the most of out of your medicines.      Try to check your blood pressure at least once weekly.      Remain on Wegovy 0.25 mg once weekly for at least an additional 4 weeks.  If you continue to lose weight and continue to be free of side effects, remain on 0.25 mg once weekly.     Otherwise, then increase to 0.5 mg once weekly thereafter.  Can consider use of the 1 mg strength if needed after at least 4 weeks at 0.5 mg provided you are not having side effects.     Follow-up with Charlotte Ash.D. as scheduled in November.    It was great speaking with you today.  I value your experience and would be very thankful for your time in providing feedback in our clinic survey. In the next few days, you may receive an email or text message from [employer] with a link to a survey related to your  clinical pharmacist.\"     To schedule another MTM appointment, please call the clinic directly or you may call the MTM scheduling line at [phone].    My Clinical Pharmacist's contact information:                                                      Please feel free to contact me with any questions or concerns you have.      Chava Sullivan, PharmD, BCACP  Medication Therapy Management Pharmacist  [facility]    "

## 2024-08-02 NOTE — Clinical Note
8/2/2024      RE: Sam Morales  [address] [facility]  [address]  [place] [address]       Dear Colleague,    Thank you for the opportunity to participate in the care of your patient, Sam Morales, at the [facility] [place] [facility]. Please see a copy of my visit note below.    Medication Therapy Management (MTM) Encounter    ASSESSMENT:                            Medication Adherence/Access: {adherencechoices:623846}    Weight management: ***      PLAN:                            Try to check your blood pressure at least once weekly.     SUBJECTIVE/OBJECTIVE:                          Sam Morales is a 39 year old female called for a follow-up visit.       Reason for visit: Wegovy restart.    Allergies/ADRs: Reviewed in chart  Past Medical History: Reviewed in chart  Tobacco: She reports that she has never smoked. She has never been exposed to tobacco smoke. She has never used smokeless tobacco.      Medication Adherence/Access: no issues reported    Weight management:  Wegovy 0.25 mg weekly    Restarted at 0.25 mg, is due for 4th shot Dewey. Has had no side effects to date. Has had no nausea, vomiting. Denies constipation, some softer bowel movements but no diarrhea, daily bowel movements. No abdominal pain or acid reflux.   Has the 1 mg strength on hand from previous titration effort. Has made substantial dietary changes prior to this instance of trying Wegovy. Has lots of fruits and veggies, smoothie morning/banana, midday meal reduced portions, veggies and salmon for dinner.  Overall monitoring calories, carbs, avoiding added sugar, following dietitian guidance. Estimates around 0906-0653 calories daily. Weight loss progress has continued, but slowed to an extent. Still working out 6 days/week, 30 minutes/day. Walking dog, gym x 20 minutes. Never tried Omeprazole due to being a capsule, cannot swallow capsules. Has not required ondansetron  "since restarting Wegovy, previously required daily until discontinuation initially.     Wt Readings from Last 4 Encounters:   06/19/24 123.4 kg (272 lb)   04/17/24 134.3 kg (296 lb)   03/01/24 134.4 kg (296 lb 3.2 oz)   04/11/23 133.8 kg (295 lb)     Body Mass Index (BMI) There is no height or weight on file to calculate BMI.  ***  Today's Vitals: There were no vitals taken for this visit.    Lab Results   Component Value Date    A1C 5.6 03/01/2024    A1C 5.5 03/07/2023     HTN:   Lisinopril 20 mg every day    Home monitored blood pressures have been stable, hasn't checked recently. Hasn't had any light headedness or dizziness.     BP Readings from Last 3 Encounters:   03/01/24 106/84   04/11/23 124/82   03/07/23 (!) 132/100      ----------------  {FARRAH?:745521}    I spent {mt total time 3:741173} with this patient today. { :854148}. A copy of the visit note was provided to the patient's provider(s).    A summary of these recommendations {GIVEN/NOT GIVEN:375441}.    Chava Sullivan, PharmD, BCACP  Medication Therapy Management Pharmacist  [facility]     Telemedicine Visit Details  Type of service:  {telemedvisitmtm:866030::\"Telephone visit\"}  Start Time: {video/phone visit start time:152948}  End Time: {video/phone visit end time:152948}     Medication Therapy Recommendations  No medication therapy recommendations to display         Please do not hesitate to contact me if you have any questions/concerns.     Sincerely,     CHAVA SULLIVAN [facility]  "

## 2024-08-02 NOTE — NURSING NOTE
Current patient location: [address] [place] [address]  [address]  [facility] [address]    Is the patient currently in the state of [place]? YES    Visit mode:TELEPHONE    If the visit is dropped, the patient can be reconnected by: TELEPHONE VISIT: Phone number:   Telephone Information:   Mobile [phone]       Will anyone else be joining the visit? NO  (If patient encounters technical issues they should call [phone] :[phone])    How would you like to obtain your AVS? MyChart    Are changes needed to the allergy or medication list? No, Pt stated no changes to allergies, and Pt stated no med changes    Are refills needed on medications prescribed by this physician? NO    Rooming Documentation:  Not applicable      Reason for visit: Medication Therapy Management    Henny OWEN

## 2024-09-12 ENCOUNTER — MYC MEDICAL ADVICE (OUTPATIENT)
Dept: CARDIOLOGY | Facility: CLINIC | Age: 39
End: 2024-09-12
Payer: COMMERCIAL

## 2024-10-14 ENCOUNTER — VIRTUAL VISIT (OUTPATIENT)
Dept: PHARMACY | Facility: CLINIC | Age: 39
End: 2024-10-14
Attending: PHYSICIAN ASSISTANT
Payer: COMMERCIAL

## 2024-10-14 VITALS — BODY MASS INDEX: 38.77 KG/M2 | WEIGHT: 255.8 LBS | HEIGHT: 68 IN

## 2024-10-14 DIAGNOSIS — E66.812 OBESITY, CLASS II, BMI 35-39.9: Primary | ICD-10-CM

## 2024-10-14 ASSESSMENT — PAIN SCALES - GENERAL: PAINLEVEL: MODERATE PAIN (4)

## 2024-10-14 NOTE — PATIENT INSTRUCTIONS
"Recommendations from MTM Pharmacist visit:                                                    MTM (medication therapy management) is a service provided by a clinical pharmacist designed to help you get the most of out of your medicines.  You may be sent a phone or email survey evaluating today's visit.  Please provide feedback you have for the service he received today if you are able.    Stop Wegovy due to side effects.  Consider alternatives -   Zepbound is similar to Wegovy and we expect fewer side effects. But as you know, the coverage will be changing in 2025. This is the cash option we discussed (Zepbound vial: https://zepbound.[employer]/how-to-use?tab=vial-tab#tab-container)  Topiramate is the tablet that can help with cravings ( I think it would be good to recheck kidney function before we start this just to check on kidney status - see more info below)  Naltrexone is the tablet that can help reduce food reward pathways. See more info below.    Follow up: 12/16 with Charlotte Warren, [facility]         It was great speaking with you today.  I value your experience and would be very thankful for your time in providing feedback in our clinic survey. In the next few days, you may receive an email or text message from [facility] [employer] with a link to a survey related to your  clinical pharmacist.\"     To schedule another MTM appointment, please call the clinic directly ([facility] Phone Number: [phone] (schedules for [facility] and [facility] - providers, dietitians, health coaches) or you may call the MTM scheduling line at [phone] or toll-free at [phone].     My Clinical Pharmacist's contact information:                                                      Please feel free to contact me with any questions or concerns you have.      Charlotte Warren, Pharm D., MPH    Medication Therapy Management Pharmacist   [facility]    -----------------------------------------  .          Topiramate (Topamax) is a medication that is used most often to treat migraine headaches or for seizures. It has also been found to help with weight loss. Although it's not currently FDA approved for weight loss, it has been used safely for a number of years to help people who are carrying extra weight.      Just how topiramate helps with weight loss has not been exactly determined. However it seems to work on areas of the brain to quiet down signals related to eating.       Topiramate may make you:                >feel less interest in eating in between meals               >think less about food and eating               >find it easier to push the plate away               >find giving up pop easier                          >have an easier time eating less     For some of our patients, the pills work right away. They feel and think quite differently about food. Other patients don't feel much of a change but find in fact they have lost weight! Like all weight loss medications, topiramate works best when you help it work.  This means:             1) Have less tempting high calorie (fattening) food around the house or office                2) Have lower calorie food (fruits, vegetables,low fat meats and dairy) for snacks                 3) Eat out only one time or less each week.               4) Eat your meals at a table with the TV or computer off.     Side-effects. Topiramate is generally well tolerated. The main side-effects we see are:               Tingling in hands,feet, or face (usually not very troublesome)               Mental confusion and word finding trouble (about 10% of patients have this.)                        Feeling sleepy or a bit dopey- this goes away very soon after starting.     One of the dangers of topiramate is the possibility of birth defects--if you get pregnant when you are on it, there is the risk that your baby will be born  with a cleft lip or palate.  If you are on topiramate and of child bearing age, you need to be on a reliable form of birth control or refrain from sexual intercourse.      Please refer to the pharmacy insert for more information on side-effects. Since many pharmacists are not familiar with the use of topiramate in weight loss, calling the clinic will get you the most accurate information on the use of this medication for weight loss.                In order to get refills of this or any medication we prescribe you must be seen in the medical weight mgmt clinic every 2-3 months. Please have your pharmacy fax a refill request.    Topiramate and Alcohol Use:     In general, when we are focusing on leading a healthier lifestyle with your nutrition, try to refrain from consistent daily alcohol use as this contains empty calories with no nutritional value. An interaction between topiramate and alcohol exists: alcohol may enhance the brain depressant effect of topiramate and alcohol may also increase the concentration of topiramate within the body. This means you may become more drowsy/tired when taking in combination and be at risk for further cognitive issues. If you are going to drink alcohol, you should do so sparingly and limit to 1 alcoholic beverage when you do drink, this is especially true for the first time that you drink alcohol on this medication so you can safely see how you feel on the medication.          -----------------------------------------  .    Patient Education   Naltrexone Oral Tablet (NALTREXONE - ORAL)  This medicine is used for the following purposes:  drug addiction  alcohol dependence  Brand Name(s): ReVia  Generic Name: Naltrexone  Instructions  Swallow with a full glass (8 oz) of water unless your doctor gives you different instructions.  You may take with food to prevent stomach upset.  This medicine will work best if you take it at about the same time every day.  Store at room temperature  away from heat, light, and moisture. Do not keep in the bathroom.  It is important that you keep taking each dose of this medicine on time even if you are feeling well.  If you forget to take a dose on time, take it as soon as you remember. If it is almost time for the next dose, do not take the missed dose. Return to your normal schedule. Do not take 2 doses at one time.  Drug interactions can change how medicines work or increase risk for side effects. Tell your health care providers about all medicines taken. Include prescription and over-the-counter medicines, vitamins, and herbal medicines. Speak with your doctor or pharmacist before starting or stopping any medicine.  If you need to stop this medicine, your doctor may wish to gradually reduce the dosage before stopping.  Keep all appointments for medical exams and tests while on this medicine.  Cautions  Tell your doctor and pharmacist if you ever had an allergic reaction to a medicine.  This medicine may cause you to experience some withdrawal symptoms from your pain medication. Tell your doctor right away if you have unusual sweating, chills, stomach pain, diarrhea, yawning or irritability.  Some patients taking this medicine have experienced serious side effects. Please speak with your doctor to understand the risks and benefits associated with this medicine.  This medicine is associated with a rare, but serious problem of the liver. Speak to your doctor about the early signs of liver problems and the benefits and risks of using this medicine.  Do not use the medication any more than instructed.  This medicine may cause dizziness or fainting. Do not stand or sit up quickly.  If possible, avoid using with alcohol, marijuana, or other medicines that can cause dizziness or drowsiness. These include allergy/cold products, muscle relaxers, sleep aids, and pain relievers.  Your ability to stay alert or to react quickly may be impaired by this medicine. Do not  drive or operate machinery until you know how this medicine will affect you.  Call the doctor if there are any signs of confusion or unusual changes in behavior.  Tell the doctor or pharmacist if you are pregnant, planning to be pregnant, or breastfeeding.  Contact your doctor immediately if you experience any swelling of your hands, face, lips, eyes, throat or tongue.  Always carry an ID card or wear a medical alert bracelet indicating your medical condition.  Do not share this medicine with anyone who has not been prescribed this medicine.  Always refill this medicine before it runs out.  Side Effects  The following is a list of some common side effects from this medicine. Please speak with your doctor about what you should do if you experience these or other side effects.  agitated feeling or trouble sleeping  dizziness  lack of energy and tiredness  headaches  nausea  restlessness  Call your doctor or get medical help right away if you notice any of these more serious side effects:  abdominal cramps  severe or persistent abdominal pain  decreased awareness or responsiveness  bone pain  confusion  diarrhea  hallucinations (unusual thoughts, seeing or hearing things that are not real)  pain in the joints  signs of liver damage (such as yellowing of eye or skin, dark urine, or unusual tiredness)  muscle aches, spasms or abnormal movements  nervousness  runny nose  shortness of breath  severe or persistent vomiting  A few people may have an allergic reaction to this medicine. Symptoms can include difficulty breathing, skin rash, itching, swelling, or severe dizziness. If you notice any of these symptoms, seek medical help quickly.  Please speak with your doctor, nurse, or pharmacist if you have any questions about this medicine.  IMPORTANT NOTE: This document tells you briefly how to take your medicine, but it does not tell you all there is to know about it. Your doctor or pharmacist may give you other documents  about your medicine. Please talk to them if you have any questions. Always follow their advice.  There is a more complete description of this medicine available in English. Scan this code on your smartphone or tablet or use the web address below. You can also ask your pharmacist for a printout. If you have any questions, please ask your pharmacist.  The display and use of this drug information is subject to Terms of Use.  More information about NALTREXONE - ORAL      Copyright(c) 2024 [employer].  Selected from data included with permission and copyright by "[employer]. This copyrighted material has been downloaded from a licensed data provider and is not for distribution, except as may be authorized by the applicable terms of use.  Conditions of Use: The information in this database is intended to supplement, not substitute for the expertise and judgment of healthcare professionals. The information is not intended to cover all possible uses, directions, precautions, drug interactions or adverse effects nor should it be construed to indicate that use of a particular drug is safe, appropriate or effective for you or anyone else. A healthcare professional should be consulted before taking any drug, changing any diet or commencing or discontinuing any course of treatment. The display and use of this drug information is subject to express Terms of Use.  Care instructions adapted under license by your healthcare professional. If you have questions about a medical condition or this instruction, always ask your healthcare professional. [employer] disclaims any warranty or liability for your use of this information.       -----------------------------------------  .    Patient Education   Zepbound Auto-Injector 5 mg/mL (0.5 mL) (TIRZEPATIDE (WEIGHT LOSS) - INJECTION)  This medicine is used for the following purposes:  diabetes Type 2  weight loss  Instructions  This medicine is injected into  the skin. Ask your doctor, nurse, or pharmacist where on your body this medicine can be injected and how to inject it.  Carefully follow the instructions for preparing this medicine before injection.  Always inspect the medicine before using.  The liquid should be clear or light yellow.  Do not use the medicine if it contains any particles or if it has changed color.  Keep medicine in refrigerator. Do not freeze. Protect from light.  Keep the medicine in its original container.  You may store this medicine at room temperature for up to 21 days. Do not put back in refrigerator.  Never use any medicine that has .  Clean the injection site with alcohol before applying the injector.  You should hear 2 clicks when you inject the medicine. This is normal.  Do not inject into moles, scars, stretch marks, or into skin that is tender, bruised, red, scaly, thickened, or hard.  Change the location of the injection each time. Choose a location at least 1 inch from the last injection.  If you forget to use a dose on time, use it as soon as you remember. If it has been more than 4 days, skip the missed dose and use your next dose as scheduled. Do not use 2 doses of this medicine at one time.  Drug interactions can change how medicines work or increase risk for side effects. Tell your health care providers about all medicines taken. Include prescription and over-the-counter medicines, vitamins, and herbal medicines. Speak with your doctor or pharmacist before starting or stopping any medicine.  Be sure to follow your regular meal plan and exercise as discussed with your doctor.  This medicine may affect your blood sugar levels. If you have diabetes, talk to your doctor before changing the dose of your diabetes medicine.  This medicine can make birth control pills work less well. Ask your doctor or pharmacist how long you should use an extra form of birth control, such as condoms.  Keep all appointments for medical exams and  tests while on this medicine.  Do not use more than 1 dose each week.  Cautions  Tell your doctor and pharmacist if you ever had an allergic reaction to a medicine.  This medicine may increase the risk of cancer. Ask your doctor about the benefits and risks.  Do not use the medication any more than instructed.  Your ability to stay alert or to react quickly may be impaired by this medicine. Do not drive or operate machinery until you know how this medicine will affect you.  Please check with your doctor before drinking alcohol while on this medicine.  It is unknown if this medicine passes into breast milk. Ask your doctor before breastfeeding.  Do not use this medicine if you are pregnant. If you become pregnant, contact your doctor immediately.  Ask your doctor, nurse or pharmacist how to safely discard unused medicines, needles and syringes.  Do not share this medicine with anyone who has not been prescribed this medicine.  Some patients have serious side effects from this medicine. Ask your pharmacist to show you the information from the Food and Drug Administration (FDA) and discuss it with you.  Side Effects  The following is a list of some common side effects from this medicine. Please speak with your doctor about what you should do if you experience these or other side effects.  constipation or diarrhea  lack of energy and tiredness  pain, redness, swelling near injection  nausea and vomiting  stomach upset or abdominal pain  Call your doctor or get medical help right away if you notice any of these more serious side effects:  severe or persistent abdominal pain  depression or feeling sad  dizziness or lightheadedness  dry mouth  unusual or long-lasting hoarseness  low blood sugar  signs of kidney damage (such as change in urine color or bubbly urine)  unusual growth or lump on the neck  rapid heartbeat  shakiness  shortness of breath  suicidal thoughts  difficulty swallowing  excessive or unusual  sweating  persistent or unusual thirst  blurring or changes of vision  severe or persistent vomiting  A few people may have an allergic reaction to this medicine. Symptoms can include difficulty breathing, skin rash, itching, swelling, or severe dizziness. If you notice any of these symptoms, seek medical help quickly.  Please speak with your doctor, nurse, or pharmacist if you have any questions about this medicine.  IMPORTANT NOTE: This document tells you briefly how to take your medicine, but it does not tell you all there is to know about it. Your doctor or pharmacist may give you other documents about your medicine. Please talk to them if you have any questions. Always follow their advice.  There is a more complete description of this medicine available in English. Scan this code on your smartphone or tablet or use the web address below. You can also ask your pharmacist for a printout. If you have any questions, please ask your pharmacist.  The display and use of this drug information is subject to Terms of Use.  More information about TIRZEPATIDE (WEIGHT LOSS) - INJECTION      Copyright(c) 2024 [employer].  Selected from data included with permission and copyright by [employer]. This copyrighted material has been downloaded from a licensed data provider and is not for distribution, except as may be authorized by the applicable terms of use.  Conditions of Use: The information in this database is intended to supplement, not substitute for the expertise and judgment of healthcare professionals. The information is not intended to cover all possible uses, directions, precautions, drug interactions or adverse effects nor should it be construed to indicate that use of a particular drug is safe, appropriate or effective for you or anyone else. A healthcare professional should be consulted before taking any drug, changing any diet or commencing or discontinuing any course of treatment. The display  and use of this drug information is subject to express Terms of Use.  Care instructions adapted under license by your healthcare professional. If you have questions about a medical condition or this instruction, always ask your healthcare professional. [employer] disclaims any warranty or liability for your use of this information.

## 2024-10-14 NOTE — NURSING NOTE
Current patient location:  Pt states at work in [place]    Is the patient currently in the state of [place]? YES    Visit mode:TELEPHONE    If the visit is dropped, the patient can be reconnected by: TELEPHONE VISIT: Phone number:   Telephone Information:   Mobile [phone]       Will anyone else be joining the visit? NO  (If patient encounters technical issues they should call [phone])    Are changes needed to the allergy or medication list? No    Are refills needed on medications prescribed by this physician? NO    Rooming Documentation:  Questionnaire(s) not pre-assigned    Reason for visit: Medication Therapy Management    Pt states 4/10 knee pain after pain meds--injured Saturday morning (possible stepped wrong) with appt to have it looked at scheduled for tomorrow.    Igor [email or web address]

## 2024-10-14 NOTE — PROGRESS NOTES
Medication Therapy Management (MTM) Encounter    ASSESSMENT:                            Medication Adherence/Access: education provided today of [facility] insurance requirements changing - stopping GLP1/GIP Agonist coverage for weight management in 2025.   Discussed options for continuing GLP1/GIP agonist in 2025. Patient to consider costs and oral antiobesity med alternatives due to cost (see belwo)      Weight management :   Due to [place] [employer] [facility] insurance discontinuing coverage of GLP1 agonists for Weight Management in 2025 year, much of today's discussion was spent discussing next steps including alternative injectable options - paying out of pocket w/ savings card cost, compound product through [place] mail order pharmacy, and others. From this, patient wishing to  consider Zepbound cash options .    Patient not able to tolerate Wegovy - agree with stopping this. Would be candidate for Zepbound given favorable side effect profile and more potent, but not advised for short term use (patient to consider cash price in 2025 to see if interested in starting and able to afford long term).    Oral antiobesity med options; topiramate likely a good option for patient to reduce food cravings, elevated creatinine in March, recommend repeat before starting. Naltrexone also may be considered with care -history of elevated LFTs so would need to re-check before and during treatment. Could consider to help reduce reward pathway of food. Metformin may be used for insulin resistance as well in the futuure.    PLAN:                              Stop Wegovy due to side effects.  Consider alternatives -   Zepbound is similar to Wegovy and we expect fewer side effects. But as you know, the coverage will be changing in 2025. This is the cash option we discussed (Zepbound vial: https://zepbound.perla.com/how-to-use?tab=vial-tab#tab-container)  Topiramate is the tablet that can help with cravings ( I think it would be  good to recheck kidney function before we start this just to check on kidney status - see more info below)  Naltrexone is the tablet that can help reduce food reward pathways. See more info below.      Follow up: 12/16 with Charlotte Warren [facility]     SUBJECTIVE/OBJECTIVE:                          Sam Morales is a 39 year old female seen for a follow-up visit.      Patient previously seen by Sam Sullivan, NilsaD.      Reason for visit: weight management - Medication Therapy Management .    Allergies/ADRs: Reviewed in chart  Past Medical History: Reviewed in chart  Tobacco: She reports that she has never smoked. She has never been exposed to tobacco smoke. She has never used smokeless tobacco.  Alcohol: not currently using      Medication Adherence/Access: no issues reported.    Weight Management   Wegovy 0.5 mg once weekly     Follows with Bronwyn Padilla PA-C for weight management in [place]     Patient reports the following medication side effects: not eating and nausea, vomiting, diarrhea - this happened for 2 weeks. Had been taking 4 weeks total. Tried going without, and then restarted Wegovy 0.5 mg and had same effects. Has stopped this 2 weeks ago (yesterday due for dose #6).    Nutrition/Eating Habits: lean protein, fruits, veggies, works on water intake (60 oz + daily). Initial insult that caused increase in symptoms - ribs. So really finds eating whole foods, low fat, high protein, low carb works well for weight management.    Exercise/Activity: walks 1 mile daily during the week, more on the weekends, goes to gym a few days per week.       Medication Considerations:  Phentermine: positive history of hypertension, negative anxiety and negative cardiac history  Topiramate: No kidney stones, no fatigue or cognitive concerns  Naltrexone: history of elevated LFTs/liver dysfunction, no chronic opiate use  Bupropion:  no seizure, positive history of hypertension, negative anxiety history   Metformin: no history of  "renal dysfunction  GLP1:  MEN2/Medullary Thyroid Cancer: Negative   Pancreatitis: Negative   Baseline GI symptomatology: Negative. Historically had gallbladder removed, which has improved GI symptoms significantly. Gallbladder out February 2023.      Initial Consult Weight: 296 lb     Current weight today: 255 lbs 12.8 oz  Cumulative Weight Loss: -41 lb, -14 % from baseline    Wt Readings from Last 4 Encounters:   10/15/24 260 lb (117.9 kg)   10/14/24 255 lb 12.8 oz (116 kg)   06/19/24 272 lb (123.4 kg)   04/17/24 296 lb (134.3 kg)     Estimated body mass index is 38.89 kg/m  as calculated from the following:    Height as of this encounter: 5' 8\" (1.727 m).    Weight as of this encounter: 255 lb 12.8 oz (116 kg).    Lab Results   Component Value Date    A1C 5.6 03/01/2024    GLC 96 03/01/2024     03/01/2024    POTASSIUM 4.7 03/01/2024    RANDY 9.3 03/01/2024    CHLORIDE 107 03/01/2024    CO2 21 (L) 03/01/2024    BUN 14.1 03/01/2024    CR 1.16 (H) 03/01/2024    GFRESTIMATED 61 03/01/2024    CHOL 184 03/01/2024     (H) 03/01/2024    HDL 46 (L) 03/01/2024    TRIG 126 03/01/2024    TSH 1.50 03/01/2024     Liver Function Studies -   Recent Labs   Lab Test 03/01/24  1250   PROTTOTAL 6.6   ALBUMIN 4.2   BILITOTAL 0.3   ALKPHOS 114   AST 48*   ALT 64*       Today's Vitals: Ht 5' 8\" (1.727 m)   Wt 255 lb 12.8 oz (116 kg)   BMI 38.89 kg/m    ----------------      I spent 29 minutes with this patient today. All changes were made via collaborative practice agreement with Janeth Fung. A copy of the visit note was provided to the patient's provider(s).    A summary of these recommendations was sent via [employer].    Charlotte Warren, Pharm D., MPH    Medication Therapy Management Pharmacist   [facility]      Telemedicine Visit Details  The patient's medications can be safely assessed via a telemedicine encounter.  Type of service:  Telephone visit  Originating Location " (pt. Location): Home    Distant Location (provider location):  Off-site  Start Time:  1:00 PM  End Time: 1:29 PM     Medication Therapy Recommendations  Obesity, Class II, BMI 35-39.9   1 Rationale: Undesirable effect - Adverse medication event - Safety   Recommendation: Discontinue Medication - wegovy   Status: Accepted per CPA   Identified Date: 10/14/2024 Completed Date: 10/14/2024

## 2024-10-14 NOTE — Clinical Note
Johana Barnhart, sending you my full Medication Therapy Management weight management note for your consideration for Sam for other oral antiobesity meds if topiramate not sufficient.  Plan will be for her to follow up with you for weight management unless you or patient feel that referral to [facility] is needed, then I'll be here!  Charlotte Warren, PharmD

## 2024-10-15 ENCOUNTER — OFFICE VISIT (OUTPATIENT)
Dept: FAMILY MEDICINE | Facility: CLINIC | Age: 39
End: 2024-10-15
Payer: COMMERCIAL

## 2024-10-15 VITALS
WEIGHT: 260 LBS | HEART RATE: 94 BPM | SYSTOLIC BLOOD PRESSURE: 128 MMHG | BODY MASS INDEX: 39.4 KG/M2 | DIASTOLIC BLOOD PRESSURE: 76 MMHG | HEIGHT: 68 IN | OXYGEN SATURATION: 98 %

## 2024-10-15 DIAGNOSIS — M25.561 ACUTE PAIN OF RIGHT KNEE: Primary | ICD-10-CM

## 2024-10-15 PROCEDURE — 99213 OFFICE O/P EST LOW 20 MIN: CPT | Performed by: PHYSICIAN ASSISTANT

## 2024-10-15 NOTE — PROGRESS NOTES
"  Assessment & Plan     Acute pain of right knee  Pain began 3 days ago while she was walking the dog and stepped forward no twisting or fall or known injury.  Has history of pain in this knee and has had injections in the past when she was in [place] will refer to orthopedics for further follow-up  - Orthopedic  Referral            BMI  Estimated body mass index is 39.53 kg/m  as calculated from the following:    Height as of this encounter: 1.727 m (5' 8\").    Weight as of this encounter: 117.9 kg (260 lb).             Rosana Vargas is a 39 year old, presenting for the following health issues:  Knee Pain (Right knee No injury )        10/15/2024     1:06 PM   Additional Questions   Roomed by Rodger     History of Present Illness       Reason for visit:  Knee pain/injury. Also, can I get my flu shot please?  Symptom onset:  1-3 days ago  Symptoms include:  Pain in right knee  Symptom intensity:  Moderate  Symptom progression:  Staying the same  Had these symptoms before:  No   She is taking medications regularly.       Right knee pain x 4 days.  She was walking the dog and stepped down and felt a sharp pain, had pain that same day and the next day and then yesterday pain improved.  Today knee feels stiff.  Denies injury to her knee, denies fall or twisting of knee.  She did otc ibuprofen 400 mg q 4 hours and tylenol and lidocaine patch.  She applied heat, did not try ice.  Has had pain the same knee in 2020, had mri that showed mild arthritis.  She is working on weight loss.       Review of Systems  Constitutional, HEENT, cardiovascular, pulmonary, gi and gu systems are negative, except as otherwise noted.      Objective    /76 (BP Location: Left arm, Patient Position: Sitting, Cuff Size: Adult Large)   Pulse 94   Ht 1.727 m (5' 8\")   Wt 117.9 kg (260 lb)   LMP 10/01/2024 (Within Days)   SpO2 98%   BMI 39.53 kg/m    Body mass index is 39.53 kg/m .  Physical Exam   GENERAL: alert and no " distress  NECK: no adenopathy, no asymmetry, masses, or scars  RESP: lungs clear to auscultation - no rales, rhonchi or wheezes  CV: regular rate and rhythm, normal S1 S2, no S3 or S4, no murmur, click or rub, no peripheral edema  ABDOMEN: soft, nontender, no hepatosplenomegaly, no masses and bowel sounds normal  MS: right knee nttp, + crepitus, + FROM            Signed Electronically by: Bronwyn Padilla PA-C

## 2024-10-18 ENCOUNTER — MYC MEDICAL ADVICE (OUTPATIENT)
Dept: FAMILY MEDICINE | Facility: CLINIC | Age: 39
End: 2024-10-18
Payer: COMMERCIAL

## 2024-10-18 DIAGNOSIS — E66.01 MORBID OBESITY (H): Primary | ICD-10-CM

## 2024-10-18 RX ORDER — TOPIRAMATE 25 MG/1
75 TABLET, FILM COATED ORAL DAILY
Qty: 90 TABLET | Refills: 3 | Status: SHIPPED | OUTPATIENT
Start: 2024-10-18

## 2024-11-06 ENCOUNTER — OFFICE VISIT (OUTPATIENT)
Dept: ORTHOPEDICS | Facility: CLINIC | Age: 39
End: 2024-11-06
Payer: COMMERCIAL

## 2024-11-06 ENCOUNTER — ANCILLARY PROCEDURE (OUTPATIENT)
Dept: GENERAL RADIOLOGY | Facility: CLINIC | Age: 39
End: 2024-11-06
Attending: STUDENT IN AN ORGANIZED HEALTH CARE EDUCATION/TRAINING PROGRAM
Payer: COMMERCIAL

## 2024-11-06 DIAGNOSIS — M25.562 PAIN IN BOTH KNEES, UNSPECIFIED CHRONICITY: ICD-10-CM

## 2024-11-06 DIAGNOSIS — M22.2X1 PATELLOFEMORAL PAIN SYNDROME OF BOTH KNEES: Primary | ICD-10-CM

## 2024-11-06 DIAGNOSIS — M25.561 PAIN IN BOTH KNEES, UNSPECIFIED CHRONICITY: ICD-10-CM

## 2024-11-06 DIAGNOSIS — M22.2X2 PATELLOFEMORAL PAIN SYNDROME OF BOTH KNEES: Primary | ICD-10-CM

## 2024-11-06 DIAGNOSIS — M17.0 PRIMARY OSTEOARTHRITIS OF KNEES, BILATERAL: ICD-10-CM

## 2024-11-06 PROCEDURE — G2211 COMPLEX E/M VISIT ADD ON: HCPCS | Performed by: STUDENT IN AN ORGANIZED HEALTH CARE EDUCATION/TRAINING PROGRAM

## 2024-11-06 PROCEDURE — 99204 OFFICE O/P NEW MOD 45 MIN: CPT | Performed by: STUDENT IN AN ORGANIZED HEALTH CARE EDUCATION/TRAINING PROGRAM

## 2024-11-06 PROCEDURE — 73562 X-RAY EXAM OF KNEE 3: CPT | Mod: TC | Performed by: RADIOLOGY

## 2024-11-06 NOTE — PROGRESS NOTES
ASSESSMENT & PLAN    Sam was seen today for pain and pain.    Diagnoses and all orders for this visit:    Patellofemoral pain syndrome of both knees  -     XR Knee Bilateral 3 Views; Future  -     (PRE-AUTH REQUEST) DUROLANE 60 MG/3ML IX PRSY-ONCE    Primary osteoarthritis of knees, bilateral  -     (PRE-AUTH REQUEST) DUROLANE 60 MG/3ML IX PRSY-ONCE      This issue is acute and Worsening. Sam presents to our clinic today to discuss her acute bilateral, right worse than left knee pain.  Radiographs taken in clinic today reviewed with the patient and show mild degenerative changes of the medial patellofemoral compartment bilaterally.  In addition, the patient has an MRI from 2020 that shows mild degenerative changes of these compartments, as well as chronic changes of the ACL and MCL.  She localizes her pain both over the medial permed of the knee as well as around the kneecap, and states this can occur with many different activities and occurs intermittently.  She has previously had a corticosteroid injection to the knee which did not provide much relief, and a hyaluronic acid injection which provided several years of relief.  On examination, she is tender to palpation over the medial and lateral patellar facet, as well as over the medial joint line.  She has some mild pain with patellar compression and grind.  She has reproduction of pain with resisted straight leg raise, and will weakness with glute muscle testing.  Overall, these findings are most consistent with her mild degenerative changes and patellofemoral pain syndrome as the main drivers of her symptoms.  We discussed these findings and the treatment options including anti-inflammatory medicines, physical therapy, corticosteroid injections, hyaluronic acid injections, and surgical intervention.  We discussed that given the patient's age, would try to defer any corticosteroid injections until absolutely necessary.  We discussed that given she has gotten  good relief with hyaluronic acid injections in the past, we could perform prior authorization to proceed with this.  We also discussed physical therapy geared to the muscle surrounding the hip and knee to help improve the mechanics of her knee.  We determined the following plan:  - Prior authorization for bilateral HA injections sent today  - Physical therapy referral placed  - She can otherwise use over-the-counter pain medicines, ice, and heat as needed  - She will follow-up in 2 weeks for bilateral HA injections      Semaj Bonilla DO  [facility]    -----  Chief Complaint   Patient presents with    Right Knee - Pain    Left Knee - Pain       SUBJECTIVE  Sam Morales is a/an 39 year old female who is seen in consultation at the request of  Bronwyn Padilla PA-C for evaluation of bilateral knee pain.     The patient is seen with their wife.      Onset: 4 week(s) ago. Reports insidious onset without acute precipitating event.  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  CV: distal perfusion intact   Resp: normal respiratory effort without conversational dyspnea   Psych: normal mood and affect  Gait: NORMAL  Neuro: Normal light sensory exam of b/l lower extremity     B/l  Knee exam  Gait: Normal  Alignment:  [x] Normal  [] Anatomic valgus  [] Anatomic  varus  Inspection: [x] Normal  [] Ecchymosis present []Other   Palpation:  Joint Tenderness: [] No Tenderness  [x] MJL [] LJL [] MCL Margin [] LCL Margin [] Pes Anserine [] Distal Quadricep  [] Other   Peripatellar Tenderness: [] None [x] Lateral pole [x] Medial pole [x] Superior pole [] Inferior pole    Patellar tendon pain: [x] None [] Present    Patellar apprehension: [x] None [] Present    Patellar motion: [x] Normal [] Abnormal  Crepitus: [x] None [] Present  Effusion: [x] None [] Trace [] 1+ [] 2+ [] 3+  Range of motion:  Flexion: 135, Extension: 0  Strength:  [x] Full in all planes, including intact extensor mechanism [] Limited as described  Neurologic: Normal sensation   Vascular: Normal pulses   Special tests:   Lachman: lax but strong endpoint  Anterior Drawer: Negative  Sag/quad Activation: NP  Posterior Drawer: Negative  Valgus Stress: Negative at 0/30  Varus Stress: Negative at 0/30  Frank's: Negative  Thessaly: NP     Other notable findings/comments: None       RADIOLOGY:  Final results and radiologist's interpretation, available in the [facility] health record.  Images were reviewed with the patient in the office today.  My personal interpretation of the performed imaging: Mild degenerative changes of the medial and patellofemoral compartments bilaterally.  No acute fractures or bony abnormalities.

## 2024-11-06 NOTE — LETTER
11/6/2024      Sam Morales  [address]  [place] [address]      Dear Colleague,    Thank you for referring your patient, Sam Morales, to the [facility]. Please see a copy of my visit note below.    ASSESSMENT & PLAN    Sam was seen today for pain and pain.    Diagnoses and all orders for this visit:    Patellofemoral pain syndrome of both knees  -     XR Knee Bilateral 3 Views; Future  -     (PRE-AUTH REQUEST) DUROLANE 60 MG/3ML IX PRSY-ONCE    Primary osteoarthritis of knees, bilateral  -     (PRE-AUTH REQUEST) DUROLANE 60 MG/3ML IX PRSY-ONCE      This issue is acute and Worsening. Sam presents to our clinic today to discuss her acute bilateral, right worse than left knee pain.  Radiographs taken in clinic today reviewed with the patient and show mild degenerative changes of the medial patellofemoral compartment bilaterally.  In addition, the patient has an MRI from 2020 that shows mild degenerative changes of these compartments, as well as chronic changes of the ACL and MCL.  She localizes her pain both over the medial permed of the knee as well as around the kneecap, and states this can occur with many different activities and occurs intermittently.  She has previously had a corticosteroid injection to the knee which did not provide much relief, and a hyaluronic acid injection which provided several years of relief.  On examination, she is tender to palpation over the medial and lateral patellar facet, as well as over the medial joint line.  She has some mild pain with patellar compression and grind.  She has reproduction of pain with resisted straight leg raise, and will weakness with glute muscle testing.  Overall, these findings are most consistent with her mild degenerative changes and patellofemoral pain syndrome as the main drivers of her symptoms.  We discussed these findings and the treatment options including anti-inflammatory  medicines, physical therapy, corticosteroid injections, hyaluronic acid injections, and surgical intervention.  We discussed that given the patient's age, would try to defer any corticosteroid injections until absolutely necessary.  We discussed that given she has gotten good relief with hyaluronic acid injections in the past, we could perform prior authorization to proceed with this.  We also discussed physical therapy geared to the muscle surrounding the hip and knee to help improve the mechanics of her knee.  We determined the following plan:  - Prior authorization for bilateral HA injections sent today  - Physical therapy referral placed  - She can otherwise use over-the-counter pain medicines, ice, and heat as needed  - She will follow-up in 2 weeks for bilateral HA injections      Semaj Bonilla [facility] SP[facility] [place] [place]    -----  Chief Complaint   Patient presents with     Right Knee - Pain     Left Knee - Pain       SUBJECTIVE  Sam Morales is a/an 39 year old female who is seen in consultation at the request of  Bronwyn Padilla PA-C for evaluation of bilateral knee pain.     The patient is seen with their wife.      Onset: 4 week(s) ago. Reports insidious onset without acute precipitating event.  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  CV: distal perfusion  intact   Resp: normal respiratory effort without conversational dyspnea   Psych: normal mood and affect  Gait: NORMAL  Neuro: Normal light sensory exam of b/l lower extremity     B/l  Knee exam  Gait: Normal  Alignment:  [x] Normal  [] Anatomic valgus  [] Anatomic varus  Inspection: [x] Normal  [] Ecchymosis present []Other   Palpation:  Joint Tenderness: [] No Tenderness  [x] MJL [] LJL [] MCL Margin [] LCL Margin [] Pes Anserine [] Distal Quadricep  [] Other   Peripatellar Tenderness: [] None [x] Lateral pole [x] Medial pole [x] Superior pole [] Inferior pole    Patellar tendon pain: [x] None [] Present    Patellar apprehension: [x] None [] Present    Patellar motion: [x] Normal [] Abnormal  Crepitus: [x] None [] Present  Effusion: [x] None [] Trace [] 1+ [] 2+ [] 3+  Range of motion:  Flexion: 135, Extension: 0  Strength:  [x] Full in all planes, including intact extensor mechanism [] Limited as described  Neurologic: Normal sensation   Vascular: Normal pulses   Special tests:   Lachman: lax but strong endpoint  Anterior Drawer: Negative  Sag/quad Activation: NP  Posterior Drawer: Negative  Valgus Stress: Negative at 0/30  Varus Stress: Negative at 0/30  Frank's: Negative  Thessaly: NP     Other notable findings/comments: None       RADIOLOGY:  Final results and radiologist's interpretation, available in the [facility] health record.  Images were reviewed with the patient in the office today.  My personal interpretation of the performed imaging: Mild degenerative changes of the medial and patellofemoral compartments bilaterally.  No acute fractures or bony abnormalities.             Again, thank you for allowing me to participate in the care of your patient.        Sincerely,        Semaj Bonilla DO

## 2024-11-19 DIAGNOSIS — M17.0 BILATERAL PRIMARY OSTEOARTHRITIS OF KNEE: Primary | ICD-10-CM

## 2024-11-25 ENCOUNTER — OFFICE VISIT (OUTPATIENT)
Dept: ORTHOPEDICS | Facility: CLINIC | Age: 39
End: 2024-11-25
Payer: COMMERCIAL

## 2024-11-25 DIAGNOSIS — M22.2X1 PATELLOFEMORAL PAIN SYNDROME OF BOTH KNEES: ICD-10-CM

## 2024-11-25 DIAGNOSIS — M17.0 BILATERAL PRIMARY OSTEOARTHRITIS OF KNEE: Primary | ICD-10-CM

## 2024-11-25 DIAGNOSIS — M22.2X2 PATELLOFEMORAL PAIN SYNDROME OF BOTH KNEES: ICD-10-CM

## 2024-11-25 PROCEDURE — 20611 DRAIN/INJ JOINT/BURSA W/US: CPT | Mod: 50 | Performed by: STUDENT IN AN ORGANIZED HEALTH CARE EDUCATION/TRAINING PROGRAM

## 2024-11-25 RX ORDER — LIDOCAINE HYDROCHLORIDE 10 MG/ML
3 INJECTION, SOLUTION INFILTRATION; PERINEURAL
Status: COMPLETED | OUTPATIENT
Start: 2024-11-25 | End: 2024-11-25

## 2024-11-25 RX ADMIN — LIDOCAINE HYDROCHLORIDE 3 ML: 10 INJECTION, SOLUTION INFILTRATION; PERINEURAL at 15:27

## 2024-11-25 NOTE — PROGRESS NOTES
Sports Medicine Procedure Note    Diagnoses and all orders for this visit:    Bilateral primary osteoarthritis of knee    Patellofemoral pain syndrome of both knees    Other orders  -     Large Joint Injection/Arthocentesis: bilateral knee        Sam Morales is a/an 39 year old female who is seen for  Durolane  injections of bilateral knees.   Last injection: 2020, provided about 4 years of relief.     Plan:  -Bilateral knee Durolane injections performed in clinic today.  Can repeat no sooner than every 6 months as needed  - Start physical therapy and home exercise program  - Tylenol Extra Strength (1000mg) up to three times daily as needed for pain  -Apply topical Voltaren gel up to every 6 hours as needed over area of pain  -If no improvement or symptoms worsen, can follow-up with Dr. Bonilla or Dr. Jackson at any time  -Post-injection instructions were provided to the patient  -Follow-up: PRN      Post-Injection Discharge Instructions    You may shower, however avoid swimming, tub baths or hot tubs for 24 hours following your procedure  You may have a mild to moderate increase in pain for a few days following the injection.  The lidocaine (local numbing medicine) will wear off in several hours. It usually takes 3-5 days for the steroid medication to start working although it may take up to 14 days for full effect.   You may use ice packs for 10-15 minutes, 3 to 4 times a day at the injection site for comfort if needed  You may use extra strength Tylenol for pain control if necessary   If you were fasting, you may resume your normal diet and medications after the procedure  If you have diabetes, your blood sugar may be higher than normal for 10-14 days following a steroid injection. Contact your doctor who manages your diabetes if your blood sugar is significantly higher than usual    If you experience any of the following, call Sports Medicine @  [phone]  -Fever over 100 degrees F  -Swelling, bleeding,  redness, drainage, warmth at the injection site  -New or significant worsening pain    Large Joint Injection/Arthocentesis: bilateral knee    Date/Time: 11/25/2024 3:27 PM    Performed by: Rachel Jackson DO  Authorized by: Rachel Jackson DO    Needle Size:  25 G  Guidance: ultrasound    Approach:  Anterolateral  Location:  Knee  Laterality:  Bilateral      Medications (Right):  60 mg sodium hyaluronate 60 MG/3ML; 3 mL lidocaine 1 %  Medications (Left):  60 mg sodium hyaluronate 60 MG/3ML; 3 mL lidocaine 1 %  Outcome:  Tolerated well, no immediate complications  Procedure discussed: discussed risks, benefits, and alternatives    Consent Given by:  Patient  Timeout: timeout called immediately prior to procedure    Prep: patient was prepped and draped in usual sterile fashion     Ultrasound was used to ensure safe and accurate needle placement and injection. Ultrasound images of the procedure were permanently stored.  1ml of 8.4% Sodium Bicarbonate solution was used to buffer the local numbing agent for today's injection. 1ml of 8.4% Sodium Bicarbonate solution was used to buffer the local numbing agent for today's injection          Dr. Rachel Jackson DO  [facility]  Sports Medicine     -----

## 2024-11-25 NOTE — PATIENT INSTRUCTIONS
1. Bilateral primary osteoarthritis of knee    2. Patellofemoral pain syndrome of both knees        Plan:  -Bilateral knee Durolane injections performed in clinic today.  Can repeat no sooner than every 6 months as needed  - Start physical therapy and home exercise program  - Tylenol Extra Strength (1000mg) up to three times daily as needed for pain  -Apply topical Voltaren gel up to every 6 hours as needed over area of pain  -If no improvement or symptoms worsen, can follow-up with Dr. Bonilla or Dr. Jackson at any time      If you had imaging performed/ordered at your appointment today, you can expect to see your radiology results in [employer] within approximately 48 business hours.     If you have questions/concerns after your appointment, please send my team a [employer] message or call the clinic at [phone].     Dr. Rachel Jackson, , [facility]  Sports Medicine and Orthopedics    Dr. Jackson's Clinic Locations and Contact Numbers:   [place] APPOINTMENTS: [phone]      [address] [facility] RADIOLOGY: [phone]   [place] [address] PHYSICAL THERAPY: [phone]    HAND THERAPY/OT: [phone]   [place] BILLING QUESTIONS: [phone] [address] FAX: [phone]   [place] [address]         Post-Hyaluronic Acid Injection Discharge Instructions    Durolane injection was performed today in clinic  - Ok to shower today, but please do not submerge for 48 hours (including bath tub, hot tub or swimming)  - The lidocaine (local numbing medicine) will wear off in several hours. The Durolane injection usually takes several weeks to begin working, so you may not notice a difference in pain for 3-6 weeks.  - Durolane has a small risk of increasing pain/swelling. If this occurs, it generally happens within 24 hours but will usually resolve on its own. You may use ice packs for 15-20 minutes, 3 to 4 times a day at the injection site for comfort if needed, and I recommend limiting your  "activity. If pain/swelling continues, then please the office.  -Can repeat Durolane injection at 6 months with insurance authorization    If you experience any of the following, call Sports Medicine @ [phone] or [phone]  -Fever over 100 degrees F  -Swelling, bleeding, redness, drainage, warmth at the injection site  -New or significant worsening pain    Non-Operative treatment of Knee Osteoarthritis    To Decrease Stress  Try to get regular exercise and stay active, try lower impact activity such as elliptical, bicycle, swimming, or walking  Muscle Strengthening: Consider physical therapy and start home exercise program  Weight Control  Consider using walking poles/cane or a knee brace to offload knee    To Decrease Pain  Tylenol (Acetaminophen) as needed 2 tablets (1,000 mg) three times per day, not to exceed 3,000 mg per day   Trial topical Voltaren (Diclofenac) gel up to 4x daily to area of pain  Glucosamine chondroitin or Tumeric (Curcumin) supplements. (May try taking for 3 months and if helpful, continue)  Steroid injections (no sooner than every 3 months)  \"Gel\" (Viscosupplementation) injections (Synvisc, Euflexxa, etc. are brand names)  Platelet Rich Plasma (Not covered by insurance)    Free Online Resources for Knee Osteoarthritis  My Knee exercise: Online program provides education and a 6-month knee strengthening program: [email or web address]  My Joint Yoga: Online 12-week yoga program with videos for knee/hip osteoarthritis: [email or web address]/     "

## 2024-11-25 NOTE — LETTER
11/25/2024      Sam Morales  [address]  [place] [address]      Dear Colleague,    Thank you for referring your patient, Sam Morales, to the [facility]IC[facility]. Please see a copy of my visit note below.    Sports Medicine Procedure Note    Diagnoses and all orders for this visit:    Bilateral primary osteoarthritis of knee    Patellofemoral pain syndrome of both knees    Other orders  -     Large Joint Injection/Arthocentesis: bilateral knee        Sam Morales is a/an 39 year old female who is seen for  Durolane  injections of bilateral knees.   Last injection: 2020, provided about 4 years of relief.     Plan:  -Bilateral knee Durolane injections performed in clinic today.  Can repeat no sooner than every 6 months as needed  - Start physical therapy and home exercise program  - Tylenol Extra Strength (1000mg) up to three times daily as needed for pain  -Apply topical Voltaren gel up to every 6 hours as needed over area of pain  -If no improvement or symptoms worsen, can follow-up with Dr. Bonilla or Dr. Jackson at any time  -Post-injection instructions were provided to the patient  -Follow-up: PRN      Post-Injection Discharge Instructions    You may shower, however avoid swimming, tub baths or hot tubs for 24 hours following your procedure  You may have a mild to moderate increase in pain for a few days following the injection.  The lidocaine (local numbing medicine) will wear off in several hours. It usually takes 3-5 days for the steroid medication to start working although it may take up to 14 days for full effect.   You may use ice packs for 10-15 minutes, 3 to 4 times a day at the injection site for comfort if needed  You may use extra strength Tylenol for pain control if necessary   If you were fasting, you may resume your normal diet and medications after the procedure  If you have diabetes, your blood sugar may be higher than normal for  10-14 days following a steroid injection. Contact your doctor who manages your diabetes if your blood sugar is significantly higher than usual    If you experience any of the following, call Sports Medicine @  [phone]  -Fever over 100 degrees F  -Swelling, bleeding, redness, drainage, warmth at the injection site  -New or significant worsening pain    Large Joint Injection/Arthocentesis: bilateral knee    Date/Time: 11/25/2024 3:27 PM    Performed by: Rachel Jackson DO  Authorized by: Rachel Jackson DO    Needle Size:  25 G  Guidance: ultrasound    Approach:  Anterolateral  Location:  Knee  Laterality:  Bilateral      Medications (Right):  60 mg sodium hyaluronate 60 MG/3ML; 3 mL lidocaine 1 %  Medications (Left):  60 mg sodium hyaluronate 60 MG/3ML; 3 mL lidocaine 1 %  Outcome:  Tolerated well, no immediate complications  Procedure discussed: discussed risks, benefits, and alternatives    Consent Given by:  Patient  Timeout: timeout called immediately prior to procedure    Prep: patient was prepped and draped in usual sterile fashion     Ultrasound was used to ensure safe and accurate needle placement and injection. Ultrasound images of the procedure were permanently stored.  1ml of 8.4% Sodium Bicarbonate solution was used to buffer the local numbing agent for today's injection. 1ml of 8.4% Sodium Bicarbonate solution was used to buffer the local numbing agent for today's injection          Dr. Rachel Jackson DO  [facility]  Sports Medicine     -----      Again, thank you for allowing me to participate in the care of your patient.        Sincerely,        Rachel Jackson DO

## 2024-11-27 ENCOUNTER — VIRTUAL VISIT (OUTPATIENT)
Dept: FAMILY MEDICINE | Facility: CLINIC | Age: 39
End: 2024-11-27
Payer: COMMERCIAL

## 2024-11-27 ENCOUNTER — TELEPHONE (OUTPATIENT)
Dept: FAMILY MEDICINE | Facility: CLINIC | Age: 39
End: 2024-11-27

## 2024-11-27 DIAGNOSIS — K76.0 METABOLIC DYSFUNCTION-ASSOCIATED STEATOTIC LIVER DISEASE (MASLD): ICD-10-CM

## 2024-11-27 DIAGNOSIS — R79.89 ELEVATED LFTS: Primary | ICD-10-CM

## 2024-11-27 PROCEDURE — 99213 OFFICE O/P EST LOW 20 MIN: CPT | Mod: 95 | Performed by: PHYSICIAN ASSISTANT

## 2024-11-27 PROCEDURE — G2211 COMPLEX E/M VISIT ADD ON: HCPCS | Mod: 95 | Performed by: PHYSICIAN ASSISTANT

## 2024-11-27 NOTE — PROGRESS NOTES
"Sam is a 39 year old who is being evaluated via a billable video visit.          Assessment & Plan     Elevated LFTs  Fatty liver on us 2023    Metabolic dysfunction-associated steatotic liver disease (MASLD)  Evidence of fatty liver on us 2023 and bmi >25, meets MASLD criteria  - semaglutide (OZEMPIC) 2 MG/3ML pen  Dispense: 3 mL; Refill: 1      The longitudinal plan of care for the diagnosis(es)/condition(s) as documented were addressed during this visit. Due to the added complexity in care, I will continue to support Sam in the subsequent management and with ongoing continuity of care.        BMI  Estimated body mass index is 39.53 kg/m  as calculated from the following:    Height as of 10/15/24: 1.727 m (5' 8\").    Weight as of 10/15/24: 117.9 kg (260 lb).             Subjective   Sam is a 39 year old, presenting for the following health issues:  No chief complaint on file.    History of Present Illness       Mental Health Follow-up:  Patient presents to follow-up on Depression.Patient's depression since last visit has been:  Medium  The patient is not having other symptoms associated with depression.      Any significant life events: No  Patient is not feeling anxious or having panic attacks.  Patient has no concerns about alcohol or drug use.    Reason for visit:  Follow up on new medication. New concern not related to med    She eats 4 or more servings of fruits and vegetables daily.She consumes 0 sweetened beverage(s) daily.She exercises with enough effort to increase her heart rate 30 to 60 minutes per day.  She exercises with enough effort to increase her heart rate 7 days per week.   She is taking medications regularly.     Weight loss  Previously on wegovy, had nausea when she increased to 0.5 mg so plan was to change to zepbound but insurance stopped coverage of zepbound in 2025.  She changed to topamax and is currently at 75 mg at bedtime and has noticed she's lost 1-2 lbs in the past 2 months.  "         Review of Systems  Constitutional, HEENT, cardiovascular, pulmonary, gi and gu systems are negative, except as otherwise noted.      Objective           Vitals:  No vitals were obtained today due to virtual visit.    Physical Exam   GENERAL: alert and no distress  EYES: Eyes grossly normal to inspection.  No discharge or erythema, or obvious scleral/conjunctival abnormalities.  RESP: No audible wheeze, cough, or visible cyanosis.    SKIN: Visible skin clear. No significant rash, abnormal pigmentation or lesions.  NEURO: Cranial nerves grossly intact.  Mentation and speech appropriate for age.  PSYCH: Appropriate affect, tone, and pace of words          Video-Visit Details    Type of service:  Video Visit   Originating Location (pt. Location): Home    Distant Location (provider location):  Off-site  Platform used for Video Visit: Jeffery  Signed Electronically by: Bronwyn Padilla PA-C

## 2024-12-17 ENCOUNTER — TELEPHONE (OUTPATIENT)
Dept: ENDOCRINOLOGY | Facility: CLINIC | Age: 39
End: 2024-12-17
Payer: COMMERCIAL

## 2024-12-17 NOTE — TELEPHONE ENCOUNTER
MTM appointment cancelled, we made one more attempt to reschedule.     Routing back to referring provider and MTM Pharmacist Team    [facility]

## 2025-01-05 DIAGNOSIS — E66.01 MORBID OBESITY (H): ICD-10-CM

## 2025-01-07 RX ORDER — TOPIRAMATE 25 MG/1
75 TABLET, FILM COATED ORAL DAILY
Qty: 270 TABLET | Refills: 2 | Status: SHIPPED | OUTPATIENT
Start: 2025-01-07

## 2025-01-27 ENCOUNTER — E-VISIT (OUTPATIENT)
Dept: URGENT CARE | Facility: CLINIC | Age: 40
End: 2025-01-27
Payer: COMMERCIAL

## 2025-01-27 DIAGNOSIS — R06.02 SOB (SHORTNESS OF BREATH): ICD-10-CM

## 2025-01-27 DIAGNOSIS — R05.1 ACUTE COUGH: Primary | ICD-10-CM

## 2025-01-27 PROCEDURE — 99207 PR NON-BILLABLE SERV PER CHARTING: CPT | Performed by: FAMILY MEDICINE

## 2025-01-27 NOTE — PATIENT INSTRUCTIONS
Dear Sam Morales,    We are sorry you are not feeling well. Based on the responses you provided with cough and SOB, it is recommended that you be seen in-person in urgent care so we can better evaluate your symptoms. Please click here to find the nearest urgent care location to you.   You will not be charged for this Visit. Thank you for trusting us with your care.    DAMIEN Mills CNP

## 2025-01-28 ENCOUNTER — ANCILLARY PROCEDURE (OUTPATIENT)
Dept: GENERAL RADIOLOGY | Facility: CLINIC | Age: 40
End: 2025-01-28
Attending: PHYSICIAN ASSISTANT
Payer: COMMERCIAL

## 2025-01-28 ENCOUNTER — OFFICE VISIT (OUTPATIENT)
Dept: URGENT CARE | Facility: URGENT CARE | Age: 40
End: 2025-01-28
Payer: COMMERCIAL

## 2025-01-28 VITALS
TEMPERATURE: 99.8 F | OXYGEN SATURATION: 96 % | DIASTOLIC BLOOD PRESSURE: 106 MMHG | RESPIRATION RATE: 18 BRPM | SYSTOLIC BLOOD PRESSURE: 156 MMHG | HEART RATE: 122 BPM

## 2025-01-28 DIAGNOSIS — J10.1 INFLUENZA A: Primary | ICD-10-CM

## 2025-01-28 DIAGNOSIS — B34.9 VIRAL ILLNESS: ICD-10-CM

## 2025-01-28 DIAGNOSIS — R05.1 ACUTE COUGH: ICD-10-CM

## 2025-01-28 LAB
ANION GAP SERPL CALCULATED.3IONS-SCNC: 9 MMOL/L (ref 3–14)
BASOPHILS # BLD AUTO: 0 10E3/UL (ref 0–0.2)
BASOPHILS NFR BLD AUTO: 0 %
BUN SERPL-MCNC: 12 MG/DL (ref 7–30)
CALCIUM SERPL-MCNC: 9.2 MG/DL (ref 8.5–10.1)
CHLORIDE BLD-SCNC: 106 MMOL/L (ref 94–109)
CO2 SERPL-SCNC: 26 MMOL/L (ref 20–32)
CREAT SERPL-MCNC: 1.3 MG/DL (ref 0.52–1.04)
DEPRECATED S PYO AG THROAT QL EIA: NEGATIVE
EGFRCR SERPLBLD CKD-EPI 2021: 53 ML/MIN/1.73M2
EOSINOPHIL # BLD AUTO: 0 10E3/UL (ref 0–0.7)
EOSINOPHIL NFR BLD AUTO: 0 %
ERYTHROCYTE [DISTWIDTH] IN BLOOD BY AUTOMATED COUNT: 15.6 % (ref 10–15)
FLUAV AG SPEC QL IA: POSITIVE
FLUBV AG SPEC QL IA: NEGATIVE
GLUCOSE BLD-MCNC: 103 MG/DL (ref 70–99)
HCT VFR BLD AUTO: 39 % (ref 35–47)
HGB BLD-MCNC: 12.2 G/DL (ref 11.7–15.7)
IMM GRANULOCYTES # BLD: 0.1 10E3/UL
IMM GRANULOCYTES NFR BLD: 1 %
LYMPHOCYTES # BLD AUTO: 0.3 10E3/UL (ref 0.8–5.3)
LYMPHOCYTES NFR BLD AUTO: 4 %
MCH RBC QN AUTO: 25.8 PG (ref 26.5–33)
MCHC RBC AUTO-ENTMCNC: 31.3 G/DL (ref 31.5–36.5)
MCV RBC AUTO: 83 FL (ref 78–100)
MONOCYTES # BLD AUTO: 0.8 10E3/UL (ref 0–1.3)
MONOCYTES NFR BLD AUTO: 9 %
NEUTROPHILS # BLD AUTO: 7.2 10E3/UL (ref 1.6–8.3)
NEUTROPHILS NFR BLD AUTO: 86 %
PLATELET # BLD AUTO: 251 10E3/UL (ref 150–450)
POTASSIUM BLD-SCNC: 3.8 MMOL/L (ref 3.4–5.3)
RBC # BLD AUTO: 4.72 10E6/UL (ref 3.8–5.2)
S PYO DNA THROAT QL NAA+PROBE: NOT DETECTED
SODIUM SERPL-SCNC: 141 MMOL/L (ref 135–145)
WBC # BLD AUTO: 8.4 10E3/UL (ref 4–11)

## 2025-01-28 PROCEDURE — 36415 COLL VENOUS BLD VENIPUNCTURE: CPT | Performed by: PHYSICIAN ASSISTANT

## 2025-01-28 PROCEDURE — 87804 INFLUENZA ASSAY W/OPTIC: CPT | Performed by: PHYSICIAN ASSISTANT

## 2025-01-28 PROCEDURE — 80048 BASIC METABOLIC PNL TOTAL CA: CPT | Performed by: PHYSICIAN ASSISTANT

## 2025-01-28 PROCEDURE — 87651 STREP A DNA AMP PROBE: CPT | Performed by: PHYSICIAN ASSISTANT

## 2025-01-28 PROCEDURE — 85025 COMPLETE CBC W/AUTO DIFF WBC: CPT | Performed by: PHYSICIAN ASSISTANT

## 2025-01-28 PROCEDURE — 99214 OFFICE O/P EST MOD 30 MIN: CPT | Performed by: PHYSICIAN ASSISTANT

## 2025-01-28 PROCEDURE — 71046 X-RAY EXAM CHEST 2 VIEWS: CPT | Mod: TC | Performed by: RADIOLOGY

## 2025-01-28 RX ORDER — OSELTAMIVIR PHOSPHATE 75 MG/1
75 CAPSULE ORAL 2 TIMES DAILY
Qty: 10 CAPSULE | Refills: 0 | Status: SHIPPED | OUTPATIENT
Start: 2025-01-28 | End: 2025-02-02

## 2025-01-28 RX ORDER — BENZONATATE 100 MG/1
100 CAPSULE ORAL 3 TIMES DAILY PRN
Qty: 30 CAPSULE | Refills: 0 | Status: SHIPPED | OUTPATIENT
Start: 2025-01-28 | End: 2025-02-07

## 2025-01-28 NOTE — PATIENT INSTRUCTIONS
Your rapid influenza test came back positive for flu. You are contagious until your fever is gone for 24 hours. Maintain good hand hygiene, cover your cough, and limit contact to prevent spreading the illness. Symptoms typically last 1-2 weeks.    Symptom management:  - Drink plenty of fluids and allow for plenty of rest  - Use tylenol or ibuprofen every 4-6 hours for fever/discomfort    Reasons to be seen immediately for re-evaluation:  - Have trouble breathing or are short of breath  - Feel pain or pressure in your chest or belly  - Get suddenly dizzy  - Feel confused  - Have severe vomiting    If no symptom improvement in 1 week, follow-up with your primary care provider.

## 2025-01-28 NOTE — PROGRESS NOTES
Patient presents with:  Cough: cough x2 weeks, fever, body aches       Clinical Decision Making:  Strep test was obtained and was negative.  Culture is to follow.  Influenza is positive. CBC did not show leukocytosis. COVID-19 screening test is pending.  Treatment with Tamiflu. Symptomatic care was gone over. Expected course of resolution and indication for return was gone over and questions were answered to patient/parent's satisfaction before discharge.        ICD-10-CM    1. Influenza A  J10.1 oseltamivir (TAMIFLU) 75 MG capsule     benzonatate (TESSALON) 100 MG capsule      2. Acute cough  R05.1 CBC with Platelets & Differential     Basic metabolic panel     XR Chest 2 Views      3. Viral illness  B34.9 Streptococcus A Rapid Screen w/Reflex to PCR     Influenza A & B Antigen     Group A Streptococcus PCR Throat Swab          Patient Instructions       Your rapid influenza test came back positive for flu. You are contagious until your fever is gone for 24 hours. Maintain good hand hygiene, cover your cough, and limit contact to prevent spreading the illness. Symptoms typically last 1-2 weeks.    Symptom management:  - Drink plenty of fluids and allow for plenty of rest  - Use tylenol or ibuprofen every 4-6 hours for fever/discomfort    Reasons to be seen immediately for re-evaluation:  - Have trouble breathing or are short of breath  - Feel pain or pressure in your chest or belly  - Get suddenly dizzy  - Feel confused  - Have severe vomiting    If no symptom improvement in 1 week, follow-up with your primary care provider.      HPI:  Sam Morales is a 39 year old female who presents today one day acute onset of Influenza like illness symptoms to include fever, dry nonproductive cough, sore throat, odynophagia, rhinorrhea, myalgias, arthralgias, headache and fatigue nausea and vomiting.  Has been using over-the-counter cough medicine with Robitussin and Delsym.  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  Last COVID test was yesterday.    Patient had acute onset of all the above symptoms.    Patient has not had a seasonal influenza immunization.    Last dose of antipyretic.  None.  Temperature in the office is currently 99.8.    Anorexia: yes.    Patient is taking fluids and is micturating.    History obtained from chart review and the patient.    Problem List:  2024-11: Metabolic dysfunction-associated steatotic liver disease   (MASLD)  2023-03: Morbid obesity (H)  2023-03: Elevated blood pressure reading without diagnosis of   hypertension  2023-03: Essential hypertension  2023-03: Choledocholithiasis with acute cholecystitis with obstruction  2023-03: Acute cholecystitis  2023-03: Elevated LFTs  2006-06: Adjustment disorder with depressed mood      Past Medical History:   Diagnosis Date    Hypertension     Motion sickness     Obese        Social History     Tobacco Use    Smoking status: Never     Passive exposure: Never    Smokeless tobacco: Never   Substance Use Topics    Alcohol use: Not Currently     Comment: Not bery often, socially. Its been over 6 months since then       Review of Systems  As above in HPI otherwise negative.    Vitals:    01/28/25 1144   BP: (!) 156/106   Pulse: (!) 122   Resp: 18   Temp: 99.8  F (37.7  C)   TempSrc: Oral   SpO2: 96%       General: Patient is resting comfortably no acute distress is afebrile  HEENT: Head is normocephalic atraumatic   eyes are PERRL EOMI sclera anicteric   TMs are clear bilaterally  Throat is with mild pharyngeal wall erythema and no exudate  No cervical lymphadenopathy present  LUNGS: Clear to auscultation bilaterally  HEART: Regular rate and rhythm  Skin: Without rash non-diaphoretic    Physical Exam      Labs:  Results for orders placed or performed in visit on 01/28/25   XR Chest 2 Views     Status: None    Narrative    EXAM: XR CHEST 2 VIEWS  LOCATION: [facility]  DATE:  1/28/2025    INDICATION: Cough.  COMPARISON: None.      Impression    IMPRESSION:     Lung volumes are low. No evidence of pneumonia. No pleural effusions or pneumothorax. Normal pulmonary vascularity.    Normal cardiac silhouette.   Basic metabolic panel     Status: Abnormal   Result Value Ref Range    Sodium 141 135 - 145 mmol/L    Potassium 3.8 3.4 - 5.3 mmol/L    Chloride 106 94 - 109 mmol/L    Carbon Dioxide (CO2) 26 20 - 32 mmol/L    Anion Gap 9 3 - 14 mmol/L    Urea Nitrogen 12 7 - 30 mg/dL    Creatinine 1.30 (H) 0.52 - 1.04 mg/dL    GFR Estimate 53 (L) >60 mL/min/1.73m2    Calcium 9.2 8.5 - 10.1 mg/dL    Glucose 103 (H) 70 - 99 mg/dL   CBC with platelets and differential     Status: Abnormal   Result Value Ref Range    WBC Count 8.4 4.0 - 11.0 10e3/uL    RBC Count 4.72 3.80 - 5.20 10e6/uL    Hemoglobin 12.2 11.7 - 15.7 g/dL    Hematocrit 39.0 35.0 - 47.0 %    MCV 83 78 - 100 fL    MCH 25.8 (L) 26.5 - 33.0 pg    MCHC 31.3 (L) 31.5 - 36.5 g/dL    RDW 15.6 (H) 10.0 - 15.0 %    Platelet Count 251 150 - 450 10e3/uL    % Neutrophils 86 %    % Lymphocytes 4 %    % Monocytes 9 %    % Eosinophils 0 %    % Basophils 0 %    % Immature Granulocytes 1 %    Absolute Neutrophils 7.2 1.6 - 8.3 10e3/uL    Absolute Lymphocytes 0.3 (L) 0.8 - 5.3 10e3/uL    Absolute Monocytes 0.8 0.0 - 1.3 10e3/uL    Absolute Eosinophils 0.0 0.0 - 0.7 10e3/uL    Absolute Basophils 0.0 0.0 - 0.2 10e3/uL    Absolute Immature Granulocytes 0.1 <=0.4 10e3/uL   Streptococcus A Rapid Screen w/Reflex to PCR     Status: Normal    Specimen: Throat; Swab   Result Value Ref Range    Group A Strep antigen Negative Negative   Influenza A & B Antigen     Status: Abnormal    Specimen: Nose; Swab   Result Value Ref Range    Influenza A antigen Positive (A) Negative    Influenza B antigen Negative Negative    Narrative    Test results must be correlated with clinical data. If necessary, results should be confirmed by a molecular assay or viral culture.   CBC  with Platelets & Differential     Status: Abnormal    Narrative    The following orders were created for panel order CBC with Platelets & Differential.  Procedure                               Abnormality         Status                     ---------                               -----------         ------                     CBC with platelets and d...[[id number]]  Abnormal            Final result                 Please view results for these tests on the individual orders.       Radiology:  I have personally ordered and preliminarily reviewed the following xray, I have noted no infiltrate or consolidation    At the end of the encounter, I discussed results, diagnosis, medications. Discussed red flags for immediate return to clinic/ER, as well as indications for follow up if no improvement. Patient understood and agreed to plan. Patient was stable for discharge.

## 2025-02-27 SDOH — HEALTH STABILITY: PHYSICAL HEALTH: ON AVERAGE, HOW MANY DAYS PER WEEK DO YOU ENGAGE IN MODERATE TO STRENUOUS EXERCISE (LIKE A BRISK WALK)?: 5 DAYS

## 2025-02-27 SDOH — HEALTH STABILITY: PHYSICAL HEALTH: ON AVERAGE, HOW MANY MINUTES DO YOU ENGAGE IN EXERCISE AT THIS LEVEL?: 30 MIN

## 2025-02-27 ASSESSMENT — SOCIAL DETERMINANTS OF HEALTH (SDOH): HOW OFTEN DO YOU GET TOGETHER WITH FRIENDS OR RELATIVES?: MORE THAN THREE TIMES A WEEK

## 2025-03-13 SDOH — HEALTH STABILITY: PHYSICAL HEALTH: ON AVERAGE, HOW MANY MINUTES DO YOU ENGAGE IN EXERCISE AT THIS LEVEL?: 30 MIN

## 2025-03-13 SDOH — HEALTH STABILITY: PHYSICAL HEALTH: ON AVERAGE, HOW MANY DAYS PER WEEK DO YOU ENGAGE IN MODERATE TO STRENUOUS EXERCISE (LIKE A BRISK WALK)?: 5 DAYS

## 2025-03-13 ASSESSMENT — SOCIAL DETERMINANTS OF HEALTH (SDOH): HOW OFTEN DO YOU GET TOGETHER WITH FRIENDS OR RELATIVES?: MORE THAN THREE TIMES A WEEK

## 2025-03-19 ASSESSMENT — PATIENT HEALTH QUESTIONNAIRE - PHQ9
SUM OF ALL RESPONSES TO PHQ QUESTIONS 1-9: 0
10. IF YOU CHECKED OFF ANY PROBLEMS, HOW DIFFICULT HAVE THESE PROBLEMS MADE IT FOR YOU TO DO YOUR WORK, TAKE CARE OF THINGS AT HOME, OR GET ALONG WITH OTHER PEOPLE: NOT DIFFICULT AT ALL
SUM OF ALL RESPONSES TO PHQ QUESTIONS 1-9: 0

## 2025-03-19 ASSESSMENT — ANXIETY QUESTIONNAIRES
7. FEELING AFRAID AS IF SOMETHING AWFUL MIGHT HAPPEN: NOT AT ALL
5. BEING SO RESTLESS THAT IT IS HARD TO SIT STILL: NOT AT ALL
4. TROUBLE RELAXING: NOT AT ALL
GAD7 TOTAL SCORE: 1
GAD7 TOTAL SCORE: 1
2. NOT BEING ABLE TO STOP OR CONTROL WORRYING: NOT AT ALL
IF YOU CHECKED OFF ANY PROBLEMS ON THIS QUESTIONNAIRE, HOW DIFFICULT HAVE THESE PROBLEMS MADE IT FOR YOU TO DO YOUR WORK, TAKE CARE OF THINGS AT HOME, OR GET ALONG WITH OTHER PEOPLE: NOT DIFFICULT AT ALL
6. BECOMING EASILY ANNOYED OR IRRITABLE: NOT AT ALL
8. IF YOU CHECKED OFF ANY PROBLEMS, HOW DIFFICULT HAVE THESE MADE IT FOR YOU TO DO YOUR WORK, TAKE CARE OF THINGS AT HOME, OR GET ALONG WITH OTHER PEOPLE?: NOT DIFFICULT AT ALL
7. FEELING AFRAID AS IF SOMETHING AWFUL MIGHT HAPPEN: NOT AT ALL
3. WORRYING TOO MUCH ABOUT DIFFERENT THINGS: NOT AT ALL
GAD7 TOTAL SCORE: 1
1. FEELING NERVOUS, ANXIOUS, OR ON EDGE: SEVERAL DAYS

## 2025-03-20 ENCOUNTER — OFFICE VISIT (OUTPATIENT)
Dept: FAMILY MEDICINE | Facility: CLINIC | Age: 40
End: 2025-03-20
Attending: PHYSICIAN ASSISTANT
Payer: COMMERCIAL

## 2025-03-20 VITALS
BODY MASS INDEX: 42.13 KG/M2 | OXYGEN SATURATION: 97 % | RESPIRATION RATE: 20 BRPM | HEART RATE: 78 BPM | TEMPERATURE: 97.8 F | SYSTOLIC BLOOD PRESSURE: 138 MMHG | DIASTOLIC BLOOD PRESSURE: 86 MMHG | WEIGHT: 278 LBS | HEIGHT: 68 IN

## 2025-03-20 DIAGNOSIS — Z11.3 SCREEN FOR STD (SEXUALLY TRANSMITTED DISEASE): ICD-10-CM

## 2025-03-20 DIAGNOSIS — Z13.1 SCREENING FOR DIABETES MELLITUS: ICD-10-CM

## 2025-03-20 DIAGNOSIS — Z12.31 ENCOUNTER FOR SCREENING MAMMOGRAM FOR BREAST CANCER: ICD-10-CM

## 2025-03-20 DIAGNOSIS — E66.01 MORBID OBESITY (H): ICD-10-CM

## 2025-03-20 DIAGNOSIS — K76.0 METABOLIC DYSFUNCTION-ASSOCIATED STEATOTIC LIVER DISEASE (MASLD): ICD-10-CM

## 2025-03-20 DIAGNOSIS — R79.89 ELEVATED LFTS: ICD-10-CM

## 2025-03-20 DIAGNOSIS — Z13.220 LIPID SCREENING: ICD-10-CM

## 2025-03-20 DIAGNOSIS — I10 ESSENTIAL HYPERTENSION: ICD-10-CM

## 2025-03-20 DIAGNOSIS — Z12.4 CERVICAL CANCER SCREENING: Primary | ICD-10-CM

## 2025-03-20 DIAGNOSIS — Z00.00 ROUTINE GENERAL MEDICAL EXAMINATION AT A HEALTH CARE FACILITY: ICD-10-CM

## 2025-03-20 LAB
EST. AVERAGE GLUCOSE BLD GHB EST-MCNC: 103 MG/DL
HBA1C MFR BLD: 5.2 % (ref 0–5.6)
HIV 1+2 AB+HIV1 P24 AG SERPL QL IA: NONREACTIVE
HSV1 IGG SERPL QL IA: 35.3 INDEX
HSV1 IGG SERPL QL IA: ABNORMAL
HSV2 IGG SERPL QL IA: 0.06 INDEX
HSV2 IGG SERPL QL IA: ABNORMAL
T PALLIDUM AB SER QL: NONREACTIVE

## 2025-03-20 RX ORDER — LISINOPRIL 20 MG/1
20 TABLET ORAL DAILY
Qty: 90 TABLET | Refills: 3 | Status: SHIPPED | OUTPATIENT
Start: 2025-03-20

## 2025-03-20 NOTE — PATIENT INSTRUCTIONS
Patient Education   Preventive Care Advice   This is general advice given by our system to help you stay healthy. However, your care team may have specific advice just for you. Please talk to your care team about your preventive care needs.  Nutrition  Eat 5 or more servings of fruits and vegetables each day.  Try wheat bread, brown rice and whole grain pasta (instead of white bread, rice, and pasta).  Get enough calcium and vitamin D. Check the label on foods and aim for 100% of the RDA (recommended daily allowance).  Lifestyle  Exercise at least 150 minutes each week  (30 minutes a day, 5 days a week).  Do muscle strengthening activities 2 days a week. These help control your weight and prevent disease.  No smoking.  Wear sunscreen to prevent skin cancer.  Have a dental exam and cleaning every 6 months.  Yearly exams  See your health care team every year to talk about:  Any changes in your health.  Any medicines your care team has prescribed.  Preventive care, family planning, and ways to prevent chronic diseases.  Shots (vaccines)   HPV shots (up to age 26), if you've never had them before.  Hepatitis B shots (up to age 59), if you've never had them before.  COVID-19 shot: Get this shot when it's due.  Flu shot: Get a flu shot every year.  Tetanus shot: Get a tetanus shot every 10 years.  Pneumococcal, hepatitis A, and RSV shots: Ask your care team if you need these based on your risk.  Shingles shot (for age 50 and up)  General health tests  Diabetes screening:  Starting at age 35, Get screened for diabetes at least every 3 years.  If you are younger than age 35, ask your care team if you should be screened for diabetes.  Cholesterol test: At age 39, start having a cholesterol test every 5 years, or more often if advised.  Bone density scan (DEXA): At age 50, ask your care team if you should have this scan for osteoporosis (brittle bones).  Hepatitis C: Get tested at least once in your life.  STIs (sexually  transmitted infections)  Before age 24: Ask your care team if you should be screened for STIs.  After age 24: Get screened for STIs if you're at risk. You are at risk for STIs (including HIV) if:  You are sexually active with more than one person.  You don't use condoms every time.  You or a partner was diagnosed with a sexually transmitted infection.  If you are at risk for HIV, ask about PrEP medicine to prevent HIV.  Get tested for HIV at least once in your life, whether you are at risk for HIV or not.  Cancer screening tests  Cervical cancer screening: If you have a cervix, begin getting regular cervical cancer screening tests starting at age 21.  Breast cancer scan (mammogram): If you've ever had breasts, begin having regular mammograms starting at age 40. This is a scan to check for breast cancer.  Colon cancer screening: It is important to start screening for colon cancer at age 45.  Have a colonoscopy test every 10 years (or more often if you're at risk) Or, ask your provider about stool tests like a FIT test every year or Cologuard test every 3 years.  To learn more about your testing options, visit:   .  For help making a decision, visit:   https://bit.ly/tv16580.  Prostate cancer screening test: If you have a prostate, ask your care team if a prostate cancer screening test (PSA) at age 55 is right for you.  Lung cancer screening: If you are a current or former smoker ages 50 to 80, ask your care team if ongoing lung cancer screenings are right for you.  For informational purposes only. Not to replace the advice of your health care provider. Copyright   2023 [facility]. All rights reserved. Clinically reviewed by the [facility] Transitions Program. [employer] [id number] - REV 01/24.  Learning About Stress  What is stress?     Stress is your body's response to a hard situation. Your body can have a physical, emotional, or mental response. Stress is a fact of life for most people, and it  affects everyone differently. What causes stress for you may not be stressful for someone else.  A lot of things can cause stress. You may feel stress when you go on a job interview, take a test, or run a race. This kind of short-term stress is normal and even useful. It can help you if you need to work hard or react quickly. For example, stress can help you finish an important job on time.  Long-term stress is caused by ongoing stressful situations or events. Examples of long-term stress include long-term health problems, ongoing problems at work, or conflicts in your family. Long-term stress can harm your health.  How does stress affect your health?  When you are stressed, your body responds as though you are in danger. It makes hormones that speed up your heart, make you breathe faster, and give you a burst of energy. This is called the fight-or-flight stress response. If the stress is over quickly, your body goes back to normal and no harm is done.  But if stress happens too often or lasts too long, it can have bad effects. Long-term stress can make you more likely to get sick, and it can make symptoms of some diseases worse. If you tense up when you are stressed, you may develop neck, shoulder, or low back pain. Stress is linked to high blood pressure and heart disease.  Stress also harms your emotional health. It can make you mendez, tense, or depressed. Your relationships may suffer, and you may not do well at work or school.  What can you do to manage stress?  You can try these things to help manage stress:   Do something active. Exercise or activity can help reduce stress. Walking is a great way to get started. Even everyday activities such as housecleaning or yard work can help.  Try yoga or ashley chi. These techniques combine exercise and meditation. You may need some training at first to learn them.  Do something you enjoy. For example, listen to music or go to a movie. Practice your hobby or do volunteer  "work.  Meditate. This can help you relax, because you are not worrying about what happened before or what may happen in the future.  Do guided imagery. Imagine yourself in any setting that helps you feel calm. You can use online videos, books, or a teacher to guide you.  Do breathing exercises. For example:  From a standing position, bend forward from the waist with your knees slightly bent. Let your arms dangle close to the floor.  Breathe in slowly and deeply as you return to a standing position. Roll up slowly and lift your head last.  Hold your breath for just a few seconds in the standing position.  Breathe out slowly and bend forward from the waist.  Let your feelings out. Talk, laugh, cry, and express anger when you need to. Talking with supportive friends or family, a counselor, or a fady leader about your feelings is a healthy way to relieve stress. Avoid discussing your feelings with people who make you feel worse.  Write. It may help to write about things that are bothering you. This helps you find out how much stress you feel and what is causing it. When you know this, you can find better ways to cope.  What can you do to prevent stress?  You might try some of these things to help prevent stress:  Manage your time. This helps you find time to do the things you want and need to do.  Get enough sleep. Your body recovers from the stresses of the day while you are sleeping.  Get support. Your family, friends, and community can make a difference in how you experience stress.  Limit your news feed. Avoid or limit time on social media or news that may make you feel stressed.  Do something active. Exercise or activity can help reduce stress. Walking is a great way to get started.  Where can you learn more?  Go to https://www.[employer].net/patiented  Enter N032 in the search box to learn more about \"Learning About Stress.\"  Current as of: October 24, 2024  Content Version: 14.4 2024-2025 [employer].   Care instructions adapted under license by your healthcare professional. If you have questions about a medical condition or this instruction, always ask your healthcare professional. [employer] disclaims any warranty or liability for your use of this information.    Safer Sex: Care Instructions  Overview  Safer sex is a way to reduce your risk of getting a sexually transmitted infection (STI). It can also help prevent pregnancy.  Several products can help you practice safer sex and reduce your chance of STIs. One of the best is a condom. There are internal and external condoms. You can use a special rubber sheet (dental dam) for protection during oral sex. Disposable gloves can keep your hands from touching blood, semen, or other body fluids that can carry infections.  Remember that birth control methods such as diaphragms, IUDs, foams, and birth control pills do not stop you from getting STIs.  Follow-up care is a key part of your treatment and safety. Be sure to make and go to all appointments, and call your doctor if you are having problems. It's also a good idea to know your test results and keep a list of the medicines you take.  How can you care for yourself at home?  Think about getting vaccinated to help prevent hepatitis A, hepatitis B, and human papillomavirus (HPV). They can be spread through sex.  Use a condom every time you have sex. Use an external condom, which goes on the penis. Or use an internal condom, which goes into the vagina or anus.  Make sure you use the right size external condom. A condom that's too small can break easily. A condom that's too big can slip off during sex.  Use a new condom each time you have sex. Be careful not to poke a hole in the condom when you open the wrapper.  Don't use an internal condom and an external condom at the same time.  Never use petroleum jelly (such as Vaseline), grease, hand lotion, baby oil, or anything with oil in it. These products  "can make holes in the condom.  After intercourse, hold the edge of the condom as you remove it. This will help keep semen from spilling out of the condom.  Do not have sex with anyone who has symptoms of an STI, such as sores on the genitals or mouth.  Do not drink a lot of alcohol or use drugs before sex.  Limit your sex partners. Sex with one partner who has sex only with you can reduce your risk of getting an STI.  Don't share sex toys. But if you do share them, use a condom and clean the sex toys between each use.  Talk to any partners before you have sex. Talk about what you feel comfortable with and whether you have any boundaries with sex. And find out if your partner or partners may be at risk for any STI. Keep in mind that a person may be able to spread an STI even if they do not have symptoms. You and any partners may want to get tested for STIs.  Where can you learn more?  Go to https://www.[employer].net/patiented  Enter B608 in the search box to learn more about \"Safer Sex: Care Instructions.\"  Current as of: April 30, 2024  Content Version: 14.4    3702-6146 [employer].   Care instructions adapted under license by your healthcare professional. If you have questions about a medical condition or this instruction, always ask your healthcare professional. [employer] disclaims any warranty or liability for your use of this information.       "

## 2025-03-20 NOTE — PROGRESS NOTES
Preventive Care Visit  [facility] DIONTE Padilla PA-C, Family Medicine  Mar 20, 2025      Assessment & Plan     Cervical cancer screening  Sam has never had a pap smear or pelvic exam performed. Discussed importance of screening pap and that she is still at risk of cervical cancer even if she has never had penetrative sex. Encouraged questions about process and discussing comfort measures during exam. Willing to discuss at next visit.    Essential hypertension  Schedule nursing visit for BP recheck  - lisinopril (ZESTRIL) 20 MG tablet; Take 1 tablet (20 mg) by mouth daily.    Encounter for screening mammogram for breast cancer  Self breast exam education provided.  - MA Screen Bilateral w/Miels; Future    Screen for STD (sexually transmitted disease)  Discussed HSV IgG test with Sam and low specificity of test. Not currently symptomatic but Sam would like test completed today for her own curiosity. Discussed what positive results could mean and that there would be no follow up.  - Treponema Abs w Reflex to RPR and Titer; Future  - HIV Antigen Antibody Combo; Future  - Chlamydia & Gonorrhea by PCR, [facility] - Clinic Collect  - Herpes Simplex Virus 1 and 2 IgG; Future  - Treponema Abs w Reflex to RPR and Titer  - HIV Antigen Antibody Combo  - Herpes Simplex Virus 1 and 2 IgG    Elevated LFTs  Update imaging if LFTs are trending upward, continue to monitor if stable. Discussed weight loss as treatment for fatty liver disease.  - Comprehensive metabolic panel (BMP + Alb, Alk Phos, ALT, AST, Total. Bili, TP); Future  - Comprehensive metabolic panel (BMP + Alb, Alk Phos, ALT, AST, Total. Bili, TP)  - Lipid Profile (Chol, Trig, HDL, LDL calc); Future  - Lipid Profile (Chol, Trig, HDL, LDL calc)    Metabolic dysfunction-associated steatotic liver disease (MASLD)  - Comprehensive metabolic panel (BMP + Alb, Alk Phos, ALT, AST, Total. Bili, TP); Future  - Comprehensive metabolic panel (BMP + Alb,  "Alk Phos, ALT, AST, Total. Bili, TP)    Lipid screening  - Lipid Profile (Chol, Trig, HDL, LDL calc); Future  - Lipid Profile (Chol, Trig, HDL, LDL calc)    Screening for diabetes mellitus  - Hemoglobin A1c; Future  - Hemoglobin A1c    Morbid obesity (H)  Restart Topamax at 25 mg and titrate back up to 75 mg.    Body mass index (BMI) 40.0-44.9, adult (H)    The longitudinal plan of care for the diagnosis(es)/condition(s) as documented were addressed during this visit. Due to the added complexity in care, I will continue to support Sam in the subsequent management and with ongoing continuity of care.      BMI  Estimated body mass index is 42.27 kg/m  as calculated from the following:    Height as of this encounter: 1.727 m (5' 8\").    Weight as of this encounter: 126.1 kg (278 lb).   Weight management plan: Discussed healthy diet and exercise guidelines    Counseling  Appropriate preventive services were addressed with this patient via screening, questionnaire, or discussion as appropriate for nutrition, physical activity, and weight loss.  Checklist reviewing preventive services available has been given to the patient.  Reviewed patient's diet, addressing concerns and/or questions.       Subjective   Sam is a 39 year old, presenting for annual exam today. Sam stopped taking Topamax a couple months ago when she had influenza due to difficulty swallowing pills, wants to know if she can restart it again at same dose. Isn't sure if she has gained weight in the interim. Feeling pretty good mentally lately, sleeping well. Getting , dating someone new. Does not feel like she needs extra support at this time. Getting 30-60 mins of walking in a day. Protein shakes daily, fruit daily. 1,000-1,200 calories/day. No breast changes, does not do home breast exams. No GI/ concerns. Regular menstrual cycles. Last 3-7 days, intermittently heavy and light. No bleeding between periods. Declines pap smear today. Amenable to " STI testing. Is curious about HSV testing, no symptoms. Forgot to take daily lisinopril this morning.    Physical (Follow up lab from 1/8/25 possible repeat )        3/20/2025     9:48 AM   Additional Questions   Roomed by BONNY Soares   Accompanied by self          HPI    Advance Care Planning  Patient does not have a Health Care Directive: Discussed advance care planning with patient; however, patient declined at this time.      3/13/2025   General Health   How would you rate your overall physical health? Good   Feel stress (tense, anxious, or unable to sleep) To some extent   (!) STRESS CONCERN      3/13/2025   Nutrition   Three or more servings of calcium each day? Yes   Diet: Regular (no restrictions)   How many servings of fruit and vegetables per day? (!) 0-1   How many sweetened beverages each day? 0-1         3/13/2025   Exercise   Days per week of moderate/strenous exercise 5 days   Average minutes spent exercising at this level 30 min         3/13/2025   Social Factors   Frequency of gathering with friends or relatives More than three times a week   Worry food won't last until get money to buy more No   Food not last or not have enough money for food? No   Do you have housing? (Housing is defined as stable permanent housing and does not include staying ouside in a car, in a tent, in an abandoned building, in an overnight shelter, or couch-surfing.) Yes   Are you worried about losing your housing? No   Lack of transportation? No   Unable to get utilities (heat,electricity)? No         3/13/2025   Dental   Dentist two times every year? Yes           2/23/2024   TB Screening   Were you born outside of the [place]? No         Today's PHQ-9 Score:       3/19/2025     4:23 PM   PHQ-9 SCORE   PHQ-9 Total Score MyChart 0   PHQ-9 Total Score 0        Patient-reported         3/13/2025   Substance Use   Alcohol more than 3/day or more than 7/wk No   Do you use any other substances recreationally? No     Social History  "    Tobacco Use    Smoking status: Never     Passive exposure: Never    Smokeless tobacco: Never   Vaping Use    Vaping status: Never Used   Substance Use Topics    Alcohol use: Not Currently     Comment: Not bery often, socially. Its been over 6 months since then    Drug use: Never           3/15/2024   LAST FHS-7 RESULTS   1st degree relative breast or ovarian cancer No   Any relative bilateral breast cancer No   Any male have breast cancer Yes   Any ONE woman have BOTH breast AND ovarian cancer No   Any woman with breast cancer before 50yrs No   2 or more relatives with breast AND/OR ovarian cancer No   2 or more relatives with breast AND/OR bowel cancer No        Mammogram Screening - Mammogram every 1-2 years updated in Health Maintenance based on mutual decision making        3/13/2025   STI Screening   New sexual partner(s) since last STI/HIV test? (!) YES      History of abnormal Pap smear: No - age 30-64 HPV with reflex Pap every 5 years recommended.        ASCVD Risk   The 10-year ASCVD risk score (Hima GODOY, et al., 2019) is: 1.2%    Values used to calculate the score:      Age: 39 years      Sex: Female      Is Non- : No      Diabetic: No      Tobacco smoker: No      Systolic Blood Pressure: 142 mmHg      Is BP treated: Yes      HDL Cholesterol: 46 mg/dL      Total Cholesterol: 184 mg/dL        3/13/2025   Contraception/Family Planning   Questions about contraception or family planning No        Reviewed and updated as needed this visit by Provider                       Objective    Exam  BP (!) 142/88   Pulse 78   Temp 97.8  F (36.6  C)   Resp 20   Ht 1.727 m (5' 8\")   Wt 126.1 kg (278 lb)   LMP 03/06/2025 (Within Days)   SpO2 97%   BMI 42.27 kg/m     Estimated body mass index is 42.27 kg/m  as calculated from the following:    Height as of this encounter: 1.727 m (5' 8\").    Weight as of this encounter: 126.1 kg (278 lb).    Physical Exam  GENERAL: alert and no " distress  HENT: ear canals and TM's normal, nose and mouth without ulcers or lesions  NECK: no adenopathy, no asymmetry, masses, or scars  RESP: lungs clear to auscultation - no rales, rhonchi or wheezes  BREAST: Declines at this time  CV: regular rate and rhythm, normal S1 S2, no S3 or S4, no murmur, click or rub, no peripheral edema  ABDOMEN: soft, nontender, no hepatosplenomegaly, no masses and bowel sounds normal   (female): Declines at this time  MS: no gross musculoskeletal defects noted, no edema  SKIN: no suspicious lesions or rashes  NEURO: Normal strength and tone, mentation intact and speech normal  PSYCH: mentation appears normal, affect normal/bright    Tonja MENDIOLAN, RN, WHNP Student      Signed Electronically by: Bronwyn Padilla PA-C    Answers submitted by the patient for this visit:  Patient Health Questionnaire (Submitted on 3/19/2025)  If you checked off any problems, how difficult have these problems made it for you to do your work, take care of things at home, or get along with other people?: Not difficult at all  PHQ9 TOTAL SCORE: 0  Patient Health Questionnaire (G7) (Submitted on 3/19/2025)  UTE 7 TOTAL SCORE: 1

## 2025-04-08 ENCOUNTER — MYC MEDICAL ADVICE (OUTPATIENT)
Dept: FAMILY MEDICINE | Facility: CLINIC | Age: 40
End: 2025-04-08

## 2025-04-08 DIAGNOSIS — E66.01 MORBID OBESITY (H): Primary | ICD-10-CM

## 2025-04-08 NOTE — TELEPHONE ENCOUNTER
"Office Visit  3/20/2025  Bronwyn Padilla PA-C    Morbid obesity (H)  Restart Topamax at 25 mg and titrate back up to 75 mg.     BMI  Estimated body mass index is 42.27 kg/m  as calculated from the following:    Height as of this encounter: 1.727 m (5' 8\").    Weight as of this encounter: 126.1 kg (278 lb).   Weight management plan: Discussed healthy diet and exercise guidelines        Subjective  Sam is a 39 year old, presenting for annual exam today. Sam stopped taking Topamax a couple months ago when she had influenza due to difficulty swallowing pills, wants to know if she can restart it again at same dose. Isn't sure if she has gained weight in the interim. Feeling pretty good mentally lately, sleeping well. Getting , dating someone new. Does not feel like she needs extra support at this time. Getting 30-60 mins of walking in a day. Protein shakes daily, fruit daily. 1,000-1,200 calories/day. No breast changes, does not do home breast exams. No GI/ concerns. Regular menstrual cycles. Last 3-7 days, intermittently heavy and light. No bleeding between periods. Declines pap smear today. Amenable to STI testing. Is curious about HSV testing, no symptoms. Forgot to take daily lisinopril this morning.           "

## 2025-04-22 ENCOUNTER — OFFICE VISIT (OUTPATIENT)
Dept: FAMILY MEDICINE | Facility: CLINIC | Age: 40
End: 2025-04-22
Payer: COMMERCIAL

## 2025-04-22 VITALS
BODY MASS INDEX: 41.98 KG/M2 | TEMPERATURE: 97.9 F | HEIGHT: 68 IN | WEIGHT: 277 LBS | DIASTOLIC BLOOD PRESSURE: 72 MMHG | OXYGEN SATURATION: 96 % | HEART RATE: 86 BPM | SYSTOLIC BLOOD PRESSURE: 134 MMHG | RESPIRATION RATE: 16 BRPM

## 2025-04-22 DIAGNOSIS — Z01.818 PREOP GENERAL PHYSICAL EXAM: Primary | ICD-10-CM

## 2025-04-22 DIAGNOSIS — S02.5XXA BROKEN TEETH: ICD-10-CM

## 2025-04-22 DIAGNOSIS — I10 ESSENTIAL HYPERTENSION: ICD-10-CM

## 2025-04-22 PROCEDURE — 3078F DIAST BP <80 MM HG: CPT | Performed by: PHYSICIAN ASSISTANT

## 2025-04-22 PROCEDURE — 99213 OFFICE O/P EST LOW 20 MIN: CPT | Performed by: PHYSICIAN ASSISTANT

## 2025-04-22 PROCEDURE — 3075F SYST BP GE 130 - 139MM HG: CPT | Performed by: PHYSICIAN ASSISTANT

## 2025-04-22 RX ORDER — LISINOPRIL 30 MG/1
30 TABLET ORAL DAILY
Qty: 90 TABLET | Refills: 3 | Status: SHIPPED | OUTPATIENT
Start: 2025-04-22

## 2025-04-22 NOTE — PATIENT INSTRUCTIONS
How to Take Your Medication Before Surgery  Preoperative Medication Instructions   Antiplatelet or Anticoagulation Medication Instructions   - We reviewed the medication list and the patient is not on an antiplatelet or anticoagulation medications.    Additional Medication Instructions   - ACE/ARB/ARNI (lisinopril, enalapril, losartan, valsartan, olmesartan, sacubritril/valsartan) : DO NOT TAKE on day of surgery (minimum 11 hours for general anesthesia).       Patient Education   Preparing for Your Surgery  For Adults  Getting started  In most cases, a nurse will call to review your health history and instructions. They will give you an arrival time based on your scheduled surgery time. Please be ready to share:  Your doctor's clinic name and phone number  Your medical, surgical, and anesthesia history  A list of allergies and sensitivities  A list of medicines, including herbal treatments and over-the-counter drugs  Whether the patient has a legal guardian (ask how to send us the papers in advance)  Note: You may not receive a call if you were seen at our PAC (Preoperative Assessment Center).  Please tell us if you're pregnant--or if there's any chance you might be pregnant. Some surgeries may injure a fetus (unborn baby), so they require a pregnancy test. Surgeries that are safe for a fetus don't always need a test, and you can choose whether to have one.   Preparing for surgery  Within 10 to 30 days of surgery: Have a pre-op exam (sometimes called an H&P, or History and Physical). This can be done at a clinic or pre-operative center.  If you're having a , you may not need this exam. Talk to your care team.  At your pre-op exam, talk to your care team about all medicines you take. (This includes CBD oil and any drugs, such as THC, marijuana, and other forms of cannabis.) If you need to stop any medicine before surgery, ask when to start taking it again.  This is for your safety. Many medicines and drugs  can make you bleed too much during surgery. Some change how well surgery (anesthesia) drugs work.  Call your insurance company to let them know you're having surgery. (If you don't have insurance, call [phone].)  Call your clinic if there's any change in your health. This includes a scrape or scratch near the surgery site, or any signs of a cold (sore throat, runny nose, cough, rash, fever).  Eating and drinking guidelines  For your safety: Unless your surgeon tells you otherwise, follow the guidelines below.  Eat and drink as normal until 8 hours before you arrive for surgery. After that, no food or milk. You can spit out gum when you arrive.  Drink clear liquids until 2 hours before you arrive. These are liquids you can see through, like water, Gatorade, and Propel Water. They also include plain black coffee and tea (no cream or milk).  No alcohol for 24 hours before you arrive. The night before surgery, stop any drinks that contain THC.  If your care team tells you to take medicine on the morning of surgery, it's okay to take it with a sip of water. No other medicines or drugs are allowed (including CBD oil)--follow your care team's instructions.  If you have questions the day of surgery, call your hospital or surgery center.   Preventing infection  Shower or bathe the night before and the morning of surgery. Follow the instructions your clinic gave you. (If no instructions, use regular soap.)  Don't shave or clip hair near your surgery site. We'll remove the hair if needed.  Don't smoke or vape the morning of surgery. No chewing tobacco for 6 hours before you arrive. A nicotine patch is okay. You may spit out nicotine gum when you arrive.  For some surgeries, the surgeon will tell you to fully quit smoking and nicotine.  We will make every effort to keep you safe from infection. We will:  Clean our hands often with soap and water (or an alcohol-based hand rub).  Clean the skin at your surgery site with a  special soap that kills germs.  Give you a special gown to keep you warm. (Cold raises the risk of infection.)  Wear hair covers, masks, gowns, and gloves during surgery.  Give antibiotic medicine, if prescribed. Not all surgeries need this medicine.  What to bring on the day of surgery  Photo ID and insurance card  Copy of your health care directive, if you have one  Glasses and hearing aids (bring cases)  You can't wear contacts during surgery  Inhaler and eye drops, if you use them (tell us about these when you arrive)  CPAP machine or breathing device, if you use them  A few personal items, if spending the night  If you have . . .  A pacemaker, ICD (cardiac defibrillator), or other implant: Bring the ID card.  An implanted stimulator: Bring the remote control.  A legal guardian: Bring a copy of the certified (court-stamped) guardianship papers.  Please remove any jewelry, including body piercings. Leave jewelry and other valuables at home.  If you're going home the day of surgery  You must have a responsible adult drive you home. They should stay with you overnight as well.  If you don't have someone to stay with you, and you aren't safe to go home alone, we may keep you overnight. Insurance often won't pay for this.  After surgery  If it's hard to control your pain or you need more pain medicine, please call your surgeon's office.  Questions?   If you have any questions for your care team, list them here:   ____________________________________________________________________________________________________________________________________________________________________________________________________________________________________________________________  For informational purposes only. Not to replace the advice of your health care provider. Copyright   2003, 2019 [facility]. All rights reserved. Clinically reviewed by Chris Loyola MD. [employer] 213304 - REV 08/24.

## 2025-04-22 NOTE — PROGRESS NOTES
Preoperative Evaluation  [facility]  [address] [address]  Phone: [phone]  Fax: [phone]  Primary Provider: Bronwyn Padilla PA-C  Pre-op Performing Provider: Bronwyn Padilla PA-C  Apr 22, 2025 4/22/2025   Surgical Information   What procedure is being done? [facility]   Facility or Hospital where procedure/surgery will be performed: [facility]   Who is doing the procedure / surgery? Elias Schmid DDS   Date of surgery / procedure: 5/8/2025   Time of surgery / procedure: 8:30 am   Where do you plan to recover after surgery? at home with family     Fax number for surgical facility: phone [phone]  fax [phone]    Assessment & Plan     The proposed surgical procedure is considered INTERMEDIATE risk.    Preop general physical exam    Broken teeth    Essential hypertension  Well controlled  Will increase lisinopril to 30 mg daily to improve bp control- lab visit and nurse bp check in 3 weeks.  Ok for surgery.            - No identified additional risk factors other than previously addressed    Preoperative Medication Instructions  Antiplatelet or Anticoagulation Medication Instructions   - We reviewed the medication list and the patient is not on an antiplatelet or anticoagulation medications.    Additional Medication Instructions   - ACE/ARB/ARNI (lisinopril, enalapril, losartan, valsartan, olmesartan, sacubritril/valsartan) : DO NOT TAKE on day of surgery (minimum 11 hours for general anesthesia).    Recommendation  Approval given to proceed with proposed procedure, without further diagnostic evaluation.        Rosana Vargas is a 40 year old, presenting for the following:  Pre-Op Exam (Dental surgery)          4/22/2025     9:07 AM   Additional Questions   Roomed by BONNY Soares   Accompanied by self     HPI: multiple broken teeth, will have dental repairs        4/22/2025   Pre-Op Questionnaire   Have you ever had a  heart attack or stroke? No   Have you ever had surgery on your heart or blood vessels, such as a stent placement, a coronary artery bypass, or surgery on an artery in your head, neck, heart, or legs? No   Do you have chest pain with activity? No   Do you have a history of heart failure? No   Do you currently have a cold, bronchitis or symptoms of other infection? No   Do you have a cough, shortness of breath, or wheezing? No   Do you or anyone in your family have previous history of blood clots? No   Do you or does anyone in your family have a serious bleeding problem such as prolonged bleeding following surgeries or cuts? No   Have you ever had problems with anemia or been told to take iron pills? No   Have you had any abnormal blood loss such as black, tarry or bloody stools, or abnormal vaginal bleeding? No   Have you ever had a blood transfusion? No   Are you willing to have a blood transfusion if it is medically needed before, during, or after your surgery? Yes   Have you or any of your relatives ever had problems with anesthesia? No   Do you have sleep apnea, excessive snoring or daytime drowsiness? No   Do you have any artifical heart valves or other implanted medical devices like a pacemaker, defibrillator, or continuous glucose monitor? No   Do you have artificial joints? No   Are you allergic to latex? No     Advance Care Planning    Discussed advance care planning with patient; however, patient declined at this time.    Preoperative Review of    reviewed - no record of controlled substances prescribed.          Patient Active Problem List    Diagnosis Date Noted    Metabolic dysfunction-associated steatotic liver disease (MASLD) 11/27/2024     Priority: Medium    Morbid obesity (H) 03/07/2023     Priority: Medium    Elevated blood pressure reading without diagnosis of hypertension 03/05/2023     Priority: Medium    Essential hypertension 03/05/2023     Priority: Medium    Choledocholithiasis with  acute cholecystitis with obstruction 03/05/2023     Priority: Medium    Acute cholecystitis 03/01/2023     Priority: Medium    Elevated LFTs 03/01/2023     Priority: Medium    Adjustment disorder with depressed mood 06/28/2006     Priority: Medium      Past Medical History:   Diagnosis Date    Hypertension     Motion sickness     Obese      Past Surgical History:   Procedure Laterality Date    CHOLANGIOGRAM N/A 3/2/2023    Procedure: with cholangiograms;  Surgeon: Mayco Joseph DO;  Location: [facility] OR    ENDOSCOPIC RETROGRADE CHOLANGIOPANCREATOGRAM N/A 3/3/2023    Procedure: ENDOSCOPIC RETROGRADE CHOLANGIOPANCREATOGRAPHY, BILIARY SPHINCTEROTOMY, STONE EXTRACTION;  Surgeon: Brian Looney MD;  Location: [facility] OR    LAPAROSCOPIC CHOLECYSTECTOMY N/A 3/2/2023    Procedure: CHOLECYSTECTOMY, LAPAROSCOPIC;  Surgeon: Mayco Joseph DO;  Location: [facility] OR     Current Outpatient Medications   Medication Sig Dispense Refill    acetaminophen (TYLENOL) 500 MG tablet Take 500-1,000 mg by mouth every 6 hours as needed for mild pain      lisinopril (ZESTRIL) 20 MG tablet Take 1 tablet (20 mg) by mouth daily. 90 tablet 3       No Known Allergies     Social History     Tobacco Use    Smoking status: Never     Passive exposure: Never    Smokeless tobacco: Never   Substance Use Topics    Alcohol use: Yes     Comment: Once every few months       History   Drug Use Unknown             Review of Systems  CONSTITUTIONAL: NEGATIVE for fever, chills, change in weight  INTEGUMENTARY/SKIN: NEGATIVE for worrisome rashes, moles or lesions  EYES: NEGATIVE for vision changes or irritation  ENT/MOUTH: NEGATIVE for ear, mouth and throat problems  RESP: NEGATIVE for significant cough or SOB  BREAST: NEGATIVE for masses, tenderness or discharge  CV: NEGATIVE for chest pain, palpitations or peripheral edema  GI: NEGATIVE for nausea, abdominal pain, heartburn, or change in bowel habits  : NEGATIVE for  "frequency, dysuria, or hematuria  MUSCULOSKELETAL: NEGATIVE for significant arthralgias or myalgia  NEURO: NEGATIVE for weakness, dizziness or paresthesias  ENDOCRINE: NEGATIVE for temperature intolerance, skin/hair changes  HEME: NEGATIVE for bleeding problems  PSYCHIATRIC: NEGATIVE for changes in mood or affect    Objective    /72   Pulse 86   Temp 97.9  F (36.6  C)   Resp 16   Ht 1.727 m (5' 8\")   Wt 125.6 kg (277 lb)   LMP 04/19/2025 (Exact Date)   SpO2 96%   BMI 42.12 kg/m     Estimated body mass index is 42.12 kg/m  as calculated from the following:    Height as of this encounter: 1.727 m (5' 8\").    Weight as of this encounter: 125.6 kg (277 lb).  Physical Exam  GENERAL: alert and no distress  EYES: Eyes grossly normal to inspection, PERRL and conjunctivae and sclerae normal  HENT: ear canals and TM's normal, nose and mouth without ulcers or lesions  NECK: no adenopathy, no asymmetry, masses, or scars  RESP: lungs clear to auscultation - no rales, rhonchi or wheezes  CV: regular rate and rhythm, normal S1 S2, no S3 or S4, no murmur, click or rub, no peripheral edema  ABDOMEN: soft, nontender, no hepatosplenomegaly, no masses and bowel sounds normal  MS: no gross musculoskeletal defects noted, no edema  SKIN: no suspicious lesions or rashes  NEURO: Normal strength and tone, mentation intact and speech normal  PSYCH: mentation appears normal, affect normal/bright    Recent Labs   Lab Test 03/20/25  1105 01/28/25  1254   HGB  --  12.2   PLT  --  251    141   POTASSIUM 4.1 3.8   CR 0.98* 1.30*   A1C 5.2  --         Diagnostics  No labs were ordered during this visit.   No EKG required, no history of coronary heart disease, significant arrhythmia, peripheral arterial disease or other structural heart disease.    Revised Cardiac Risk Index (RCRI)  The patient has the following serious cardiovascular risks for perioperative complications:   - No serious cardiac risks = 0 points     RCRI " Interpretation: 0 points: Class I (very low risk - 0.4% complication rate)         Signed Electronically by: Bronwyn Padilla PA-C  A copy of this evaluation report is provided to the requesting physician.

## 2025-04-24 ENCOUNTER — TELEPHONE (OUTPATIENT)
Dept: FAMILY MEDICINE | Facility: CLINIC | Age: 40
End: 2025-04-24
Payer: COMMERCIAL

## 2025-05-14 ENCOUNTER — E-VISIT (OUTPATIENT)
Dept: URGENT CARE | Facility: CLINIC | Age: 40
End: 2025-05-14
Payer: COMMERCIAL

## 2025-05-14 DIAGNOSIS — R21 RASH: Primary | ICD-10-CM

## 2025-05-14 PROCEDURE — 99207 PR NON-BILLABLE SERV PER CHARTING: CPT | Performed by: FAMILY MEDICINE

## 2025-05-14 NOTE — PATIENT INSTRUCTIONS
Dear Sam Morales,    We are sorry you are not feeling well. Based on the responses you provided and the concern of infection, it is recommended that you be seen in-person in urgent care so we can better evaluate your symptoms. Please click here to find the nearest urgent care location to you.   You will not be charged for this Visit. Thank you for trusting us with your care.    DAMEIN Mills CNP

## 2025-06-12 ENCOUNTER — E-VISIT (OUTPATIENT)
Dept: URGENT CARE | Facility: CLINIC | Age: 40
End: 2025-06-12
Payer: COMMERCIAL

## 2025-06-12 DIAGNOSIS — B37.2 YEAST DERMATITIS: Primary | ICD-10-CM

## 2025-06-12 RX ORDER — NYSTATIN 100000 [USP'U]/G
POWDER TOPICAL
Qty: 30 G | Refills: 0 | Status: SHIPPED | OUTPATIENT
Start: 2025-06-12

## 2025-06-12 NOTE — PATIENT INSTRUCTIONS
Dear Sam Morales    This looks like at yeast dermatitis which can happen when there is too much heat and moisture under the breasts.  Try and keep it as dry as possible and I sent in a prescription for nystatin powder to use for the next 5 days.  If this does not improve, recommendation is to come in and be seen in person.    Thanks for choosing us as your health care partner,    Aisha Leonardo, CNP

## 2025-07-08 ENCOUNTER — ANCILLARY PROCEDURE (OUTPATIENT)
Dept: MAMMOGRAPHY | Facility: CLINIC | Age: 40
End: 2025-07-08
Attending: PHYSICIAN ASSISTANT
Payer: COMMERCIAL

## 2025-07-08 DIAGNOSIS — Z12.31 ENCOUNTER FOR SCREENING MAMMOGRAM FOR BREAST CANCER: ICD-10-CM

## 2025-07-08 PROCEDURE — 77063 BREAST TOMOSYNTHESIS BI: CPT

## 2025-07-27 ENCOUNTER — E-VISIT (OUTPATIENT)
Dept: URGENT CARE | Facility: CLINIC | Age: 40
End: 2025-07-27
Payer: COMMERCIAL

## 2025-07-27 DIAGNOSIS — L08.9 LOCAL INFECTION OF SKIN AND SUBCUTANEOUS TISSUE: Primary | ICD-10-CM

## 2025-07-27 DIAGNOSIS — R21 RASH AND NONSPECIFIC SKIN ERUPTION: Primary | ICD-10-CM

## 2025-07-27 PROCEDURE — 99207 PR NON-BILLABLE SERV PER CHARTING: CPT | Performed by: FAMILY MEDICINE

## 2025-07-27 NOTE — PATIENT INSTRUCTIONS
Dear Sam Morales,    We are sorry you are not feeling well. Based on the responses you provided, it is recommended that you be seen in-person in urgent care so we can better evaluate your symptoms. Please click here to find the nearest urgent care location to you.   You will not be charged for this Visit. Thank you for trusting us with your care.    Marcia Martell MD

## (undated) DEVICE — TUBING IV EXTENSION SET ANESTHESIA 34" MLL 2C6227

## (undated) DEVICE — Device

## (undated) DEVICE — SUCTION MANIFOLD NEPTUNE 2 SYS 1 PORT 702-025-000

## (undated) DEVICE — SOL RINGERS LACTATED 1000ML BAG 2B2324X

## (undated) DEVICE — DRSG TEGADERM 4X4 3/4" 1626W

## (undated) DEVICE — BLADE KNIFE SURG 11 371111

## (undated) DEVICE — PREP CHLORAPREP 26ML TINTED HI-LITE ORANGE 930815

## (undated) DEVICE — PLATE GROUNDING ADULT W/CORD 9165L

## (undated) DEVICE — WIRE GUIDE 0.35X270CM STRAIGHT TIP VISIGLIDE G-260-3527S

## (undated) DEVICE — ENDO SHEARS RENEW LAP ENDOCUT SCISSOR TIP 16.5MM 3142

## (undated) DEVICE — STOPCOCK 3 WAY 500 PSI M3SNC

## (undated) DEVICE — SOL NACL 0.9% IRRIG 1000ML BOTTLE 2F7124

## (undated) DEVICE — DRAIN BLAKE 15FR SIL 2229

## (undated) DEVICE — DECANTER VIAL 2006S

## (undated) DEVICE — DRAPE C-ARM 60X42" 1013

## (undated) DEVICE — DRSG DRAIN 4X4" 7086

## (undated) DEVICE — DRAIN RESERVOIR 100ML JP 0070740

## (undated) DEVICE — BASIN EMESIS STERILE  SSK9005A

## (undated) DEVICE — ENDO TROCAR FIRST ENTRY KII FIOS Z-THRD 11X100MM CTF33

## (undated) DEVICE — CATH CHOLANGIOGRAM 4.5FR TAUT METAL TIP 20018-M55

## (undated) DEVICE — TUBING SUCTION MEDI-VAC 1/4"X20' N620A - HE

## (undated) DEVICE — CLIP LIGACLIP LG YELLOW LT400

## (undated) DEVICE — ENDO POUCH UNIV RETRIEVAL SYSTEM INZII 10MM CD001

## (undated) DEVICE — SUTURE PASSOR W/GUIDE RSG-14F-4-WG

## (undated) DEVICE — TUBING SMOKE EVAC PNEUMOCLEAR HIGH FLOW 0620050250

## (undated) DEVICE — BALLOON EXTRACTION 15X1950MM 3.2MM TL B-V243Q-A

## (undated) DEVICE — CUSTOM PACK LAP CHOLE SBA5BLCHEA

## (undated) DEVICE — SOL WATER IRRIG 1000ML BOTTLE 2F7114

## (undated) DEVICE — NDL INSUFFLATION 13GA 120MM C2201

## (undated) DEVICE — CONTAINER URINE SPEC 4OZ STRL 1053

## (undated) DEVICE — ENDO FUSION OMNI-TOME G31903

## (undated) DEVICE — SYR 30ML LL W/O NDL 302832

## (undated) DEVICE — GLOVE BIOGEL PI ORTHOPRO SZ 7.5 47675

## (undated) DEVICE — ENDO TROCAR FIRST ENTRY KII FIOS Z-THRD 05X100MM CTF03

## (undated) DEVICE — CATH IV ANGIO INTRO 14GA X 1 3/4" 381467

## (undated) DEVICE — TUBING LAP SUCT/IRRIG STRYKER 250070500

## (undated) DEVICE — ENDO TROCAR SLEEVE KII Z-THREADED 05X100MM CTS02

## (undated) DEVICE — SU VICRYL+ 0 27 UR6 VLT VCP603H

## (undated) DEVICE — SU SILK 2-0 SH 30" K833H

## (undated) DEVICE — SU MONOCRYL+ 4-0 18IN PS2 UND MCP496G

## (undated) RX ORDER — FENTANYL CITRATE 50 UG/ML
INJECTION, SOLUTION INTRAMUSCULAR; INTRAVENOUS
Status: DISPENSED
Start: 2023-03-02

## (undated) RX ORDER — FENTANYL CITRATE 50 UG/ML
INJECTION, SOLUTION INTRAMUSCULAR; INTRAVENOUS
Status: DISPENSED
Start: 2023-03-03

## (undated) RX ORDER — ESMOLOL HYDROCHLORIDE 10 MG/ML
INJECTION INTRAVENOUS
Status: DISPENSED
Start: 2023-03-02

## (undated) RX ORDER — ONDANSETRON 2 MG/ML
INJECTION INTRAMUSCULAR; INTRAVENOUS
Status: DISPENSED
Start: 2023-03-03

## (undated) RX ORDER — PROPOFOL 10 MG/ML
INJECTION, EMULSION INTRAVENOUS
Status: DISPENSED
Start: 2023-03-03

## (undated) RX ORDER — DEXAMETHASONE SODIUM PHOSPHATE 10 MG/ML
INJECTION, SOLUTION INTRAMUSCULAR; INTRAVENOUS
Status: DISPENSED
Start: 2023-03-03